# Patient Record
Sex: MALE | Race: WHITE
[De-identification: names, ages, dates, MRNs, and addresses within clinical notes are randomized per-mention and may not be internally consistent; named-entity substitution may affect disease eponyms.]

---

## 2018-03-01 ENCOUNTER — HOSPITAL ENCOUNTER (EMERGENCY)
Dept: HOSPITAL 36 - ER | Age: 48
Discharge: HOME | End: 2018-03-01
Payer: MEDICARE

## 2018-03-01 DIAGNOSIS — N34.2: Primary | ICD-10-CM

## 2018-03-01 PROCEDURE — Z7502: HCPCS
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
## 2018-03-06 ENCOUNTER — HOSPITAL ENCOUNTER (INPATIENT)
Dept: HOSPITAL 36 - ER | Age: 48
LOS: 6 days | Discharge: HOME | DRG: 871 | End: 2018-03-12
Attending: FAMILY MEDICINE | Admitting: FAMILY MEDICINE
Payer: MEDICARE

## 2018-03-06 DIAGNOSIS — J20.9: ICD-10-CM

## 2018-03-06 DIAGNOSIS — A41.9: Primary | ICD-10-CM

## 2018-03-06 DIAGNOSIS — E87.1: ICD-10-CM

## 2018-03-06 DIAGNOSIS — Z85.72: ICD-10-CM

## 2018-03-06 DIAGNOSIS — J96.20: ICD-10-CM

## 2018-03-06 DIAGNOSIS — Z89.612: ICD-10-CM

## 2018-03-06 LAB
ALBUMIN SERPL-MCNC: 4.2 GM/DL (ref 4.2–5.5)
ALBUMIN/GLOB SERPL: 1.3 {RATIO} (ref 1–1.8)
ALP SERPL-CCNC: 93 U/L (ref 34–104)
ALT SERPL-CCNC: 37 U/L (ref 7–52)
ANION GAP SERPL CALC-SCNC: 8.8 MMOL/L (ref 7–16)
AST SERPL-CCNC: 22 U/L (ref 13–39)
BASOPHILS # BLD AUTO: 0 TH/CUMM (ref 0–0.2)
BASOPHILS NFR BLD AUTO: 0.3 % (ref 0–2)
BILIRUB SERPL-MCNC: 0.6 MG/DL (ref 0.3–1)
BUN SERPL-MCNC: 11 MG/DL (ref 7–25)
CALCIUM SERPL-MCNC: 9.6 MG/DL (ref 8.6–10.3)
CHLORIDE SERPL-SCNC: 102 MEQ/L (ref 98–107)
CO2 SERPL-SCNC: 23 MEQ/L (ref 21–31)
CREAT SERPL-MCNC: 1 MG/DL (ref 0.7–1.3)
EOSINOPHIL # BLD AUTO: 0.3 TH/CMM (ref 0.1–0.4)
EOSINOPHIL NFR BLD AUTO: 2.4 % (ref 0–5)
ERYTHROCYTE [DISTWIDTH] IN BLOOD BY AUTOMATED COUNT: 15 % (ref 11.5–20)
GLOBULIN SER-MCNC: 3.3 GM/DL
GLUCOSE SERPL-MCNC: 125 MG/DL (ref 70–105)
HCT VFR BLD CALC: 43.7 % (ref 41–60)
HGB BLD-MCNC: 15.1 GM/DL (ref 12–16)
LYMPHOCYTE AB SER FC-ACNC: 2 TH/CMM (ref 1.5–3)
LYMPHOCYTES NFR BLD AUTO: 15.5 % (ref 20–50)
MCH RBC QN AUTO: 29.2 PG (ref 26–30)
MCHC RBC AUTO-ENTMCNC: 34.6 PG (ref 28–36)
MCV RBC AUTO: 84.4 FL (ref 80–99)
MONOCYTES # BLD AUTO: 0.6 TH/CMM (ref 0.3–1)
MONOCYTES NFR BLD AUTO: 4.6 % (ref 2–10)
NEUTROPHILS # BLD: 9.8 TH/CMM (ref 1.8–8)
NEUTROPHILS NFR BLD AUTO: 77.2 % (ref 40–80)
PCO2 BLDA: 38 MMHG (ref 35–45)
PLATELET # BLD: 260 TH/CMM (ref 150–400)
PMV BLD AUTO: 7.7 FL
PO2 BLDA: 52 MMHG (ref 80–100)
POTASSIUM SERPL-SCNC: 3.8 MEQ/L (ref 3.5–5.1)
RBC # BLD AUTO: 5.18 MIL/CMM (ref 4.3–5.7)
SAO2 % BLDA: 88 % (ref 92–100)
SODIUM SERPL-SCNC: 130 MEQ/L (ref 136–145)
WBC # BLD AUTO: 12.7 TH/CMM (ref 4.8–10.8)

## 2018-03-06 PROCEDURE — Z7610: HCPCS
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
## 2018-03-07 VITALS — DIASTOLIC BLOOD PRESSURE: 75 MMHG | SYSTOLIC BLOOD PRESSURE: 121 MMHG

## 2018-03-07 LAB
ALBUMIN SERPL-MCNC: 3.8 GM/DL (ref 4.2–5.5)
ALBUMIN/GLOB SERPL: 1.3 {RATIO} (ref 1–1.8)
ALP SERPL-CCNC: 84 U/L (ref 34–104)
ALT SERPL-CCNC: 30 U/L (ref 7–52)
ANION GAP SERPL CALC-SCNC: 9 MMOL/L (ref 7–16)
AST SERPL-CCNC: 17 U/L (ref 13–39)
BASOPHILS # BLD AUTO: 0 TH/CUMM (ref 0–0.2)
BASOPHILS NFR BLD AUTO: 0.2 % (ref 0–2)
BILIRUB SERPL-MCNC: 0.5 MG/DL (ref 0.3–1)
BUN SERPL-MCNC: 11 MG/DL (ref 7–25)
CALCIUM SERPL-MCNC: 9.2 MG/DL (ref 8.6–10.3)
CHLORIDE SERPL-SCNC: 107 MEQ/L (ref 98–107)
CO2 SERPL-SCNC: 24 MEQ/L (ref 21–31)
CREAT SERPL-MCNC: 0.9 MG/DL (ref 0.7–1.3)
EOSINOPHIL # BLD AUTO: 0.4 TH/CMM (ref 0.1–0.4)
EOSINOPHIL NFR BLD AUTO: 5.4 % (ref 0–5)
ERYTHROCYTE [DISTWIDTH] IN BLOOD BY AUTOMATED COUNT: 15.2 % (ref 11.5–20)
FLUAV AG NPH QL IA: (no result)
FLUBV AG NPH QL IA: (no result)
GLOBULIN SER-MCNC: 3 GM/DL
GLUCOSE SERPL-MCNC: 105 MG/DL (ref 70–105)
HCT VFR BLD CALC: 39.9 % (ref 41–60)
HGB BLD-MCNC: 13.7 GM/DL (ref 12–16)
LYMPHOCYTE AB SER FC-ACNC: 1.8 TH/CMM (ref 1.5–3)
LYMPHOCYTES NFR BLD AUTO: 22.5 % (ref 20–50)
MCH RBC QN AUTO: 29 PG (ref 26–30)
MCHC RBC AUTO-ENTMCNC: 34.3 PG (ref 28–36)
MCV RBC AUTO: 84.7 FL (ref 80–99)
MONOCYTES # BLD AUTO: 0.7 TH/CMM (ref 0.3–1)
MONOCYTES NFR BLD AUTO: 9 % (ref 2–10)
NEUTROPHILS # BLD: 5.1 TH/CMM (ref 1.8–8)
NEUTROPHILS NFR BLD AUTO: 62.9 % (ref 40–80)
PLATELET # BLD: 217 TH/CMM (ref 150–400)
PMV BLD AUTO: 7.9 FL
POTASSIUM SERPL-SCNC: 4 MEQ/L (ref 3.5–5.1)
RBC # BLD AUTO: 4.71 MIL/CMM (ref 4.3–5.7)
SODIUM SERPL-SCNC: 136 MEQ/L (ref 136–145)
WBC # BLD AUTO: 8 TH/CMM (ref 4.8–10.8)

## 2018-03-07 RX ADMIN — IPRATROPIUM BROMIDE SCH MG: 0.5 SOLUTION RESPIRATORY (INHALATION) at 07:01

## 2018-03-07 RX ADMIN — IPRATROPIUM BROMIDE SCH MG: 0.5 SOLUTION RESPIRATORY (INHALATION) at 02:47

## 2018-03-07 RX ADMIN — IPRATROPIUM BROMIDE SCH MG: 0.5 SOLUTION RESPIRATORY (INHALATION) at 20:27

## 2018-03-07 RX ADMIN — IPRATROPIUM BROMIDE SCH MG: 0.5 SOLUTION RESPIRATORY (INHALATION) at 12:22

## 2018-03-07 RX ADMIN — IPRATROPIUM BROMIDE SCH MG: 0.5 SOLUTION RESPIRATORY (INHALATION) at 23:14

## 2018-03-07 RX ADMIN — IPRATROPIUM BROMIDE SCH MG: 0.5 SOLUTION RESPIRATORY (INHALATION) at 15:28
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
## 2018-03-07 NOTE — DIAGNOSTIC IMAGING REPORT
CHEST X-RAY: AP view



INDICATION: Cough



COMPARISON: 2/5/2017



FINDINGS: Right-sided axillary clips are noted.  Mild increased

interstitial lung markings are noted.  There is no focal consolidation

or pleural effusions The heart is normal in size. The osseous structures

demonstrate no acute abnormalities.  Small bone island of the right

humeral head is noted



IMPRESSION: 



Mild increased interstitial lung markings, nonspecific.  No focal

airspace consolidation identified.



Postsurgical changes of the right axillary region.

## 2018-03-08 RX ADMIN — HYDROCODONE BITATRATE AND ACETAMINOPHEN PRN TAB: 10; 325 TABLET ORAL at 20:49

## 2018-03-08 RX ADMIN — IPRATROPIUM BROMIDE SCH MG: 0.5 SOLUTION RESPIRATORY (INHALATION) at 20:42

## 2018-03-08 RX ADMIN — IPRATROPIUM BROMIDE SCH MG: 0.5 SOLUTION RESPIRATORY (INHALATION) at 15:37

## 2018-03-08 RX ADMIN — IPRATROPIUM BROMIDE SCH MG: 0.5 SOLUTION RESPIRATORY (INHALATION) at 11:15

## 2018-03-08 RX ADMIN — IPRATROPIUM BROMIDE SCH MG: 0.5 SOLUTION RESPIRATORY (INHALATION) at 07:45

## 2018-03-08 RX ADMIN — IPRATROPIUM BROMIDE SCH MG: 0.5 SOLUTION RESPIRATORY (INHALATION) at 03:15

## 2018-03-08 NOTE — GENERAL PROGRESS NOTE
Subjective





- Review of Systems


Events since last encounter: 





c/o cough 


deneis sob ,cp 





Objective





- Results


Result Diagrams: 


 03/07/18 06:39





 03/07/18 06:39


Recent Labs: 


 Laboratory Last Values











WBC  8.0 Th/cmm (4.8-10.8)   03/07/18  06:39    


 


RBC  4.71 Mil/cmm (4.30-5.70)   03/07/18  06:39    


 


Hgb  13.7 gm/dL (12-16)   03/07/18  06:39    


 


Hct  39.9 % (41.0-60)  L  03/07/18  06:39    


 


MCV  84.7 fl (80-99)   03/07/18  06:39    


 


MCH  29.0 pg (26.0-30.0)   03/07/18  06:39    


 


MCHC Differential  34.3 pg (28.0-36.0)   03/07/18  06:39    


 


RDW  15.2 % (11.5-20.0)   03/07/18  06:39    


 


Plt Count  217 Th/cmm (150-400)   03/07/18  06:39    


 


MPV  7.9 fl  03/07/18  06:39    


 


Neutrophils %  62.9 % (40.0-80.0)   03/07/18  06:39    


 


Lymphocytes %  22.5 % (20.0-50.0)   03/07/18  06:39    


 


Monocytes %  9.0 % (2.0-10.0)   03/07/18  06:39    


 


Eosinophils %  5.4 % (0.0-5.0)  H  03/07/18  06:39    


 


Basophils %  0.2 % (0.0-2.0)   03/07/18  06:39    


 


Specimen Source  arterial   03/06/18  21:23    


 


Sample Site  RB   03/06/18  21:23    


 


pH  7.44  (7.35-7.45)   03/06/18  21:23    


 


pCO2  38.0 mmHg (35.0-45.0)   03/06/18  21:23    


 


pO2  52.0 mmHg (80.0-100.0)  L  03/06/18  21:23    


 


HCO3  26.0 mEq/L (20.0-26.0)   03/06/18  21:23    


 


Base Excess  1.7 mEq/L (-3.0-3.0)   03/06/18  21:23    


 


O2 Saturation  88.0 % (92.0-100.0)  L  03/06/18  21:23    


 


Phong Test  Positive   03/06/18  21:23    


 


Vent Rate  N/A   03/06/18  21:23    


 


Inspired O2  21   03/06/18  21:23    


 


Tidal Volume  N/A   03/06/18  21:23    


 


PEEP  N/A   03/06/18  21:23    


 


Pressure (ins/psv/peep)  N/A   03/06/18  21:23    


 


Critical Value  MM,RCP   03/06/18  21:23    


 


Sodium  136 mEq/L (136-145)   03/07/18  06:39    


 


Potassium  4.0 mEq/L (3.5-5.1)   03/07/18  06:39    


 


Chloride  107 mEq/L ()   03/07/18  06:39    


 


Carbon Dioxide  24.0 mEq/L (21.0-31.0)   03/07/18  06:39    


 


Anion Gap  9.0  (7.0-16.0)   03/07/18  06:39    


 


BUN  11 mg/dL (7-25)   03/07/18  06:39    


 


Creatinine  0.9 mg/dL (0.7-1.3)   03/07/18  06:39    


 


Est GFR ( Amer)  > 60.0 ml/min (>90)   03/07/18  06:39    


 


Est GFR (Non-Af Amer)  > 60.0 ml/min  03/07/18  06:39    


 


BUN/Creatinine Ratio  12.2   03/07/18  06:39    


 


Glucose  105 mg/dL ()   03/07/18  06:39    


 


Whole Bld Lactic Acid  1.57 mmol/L (0.60-1.99)   03/06/18  21:55    


 


Calcium  9.2 mg/dL (8.6-10.3)   03/07/18  06:39    


 


Total Bilirubin  0.5 mg/dL (0.3-1.0)   03/07/18  06:39    


 


AST  17 U/L (13-39)   03/07/18  06:39    


 


ALT  30 U/L (7-52)   03/07/18  06:39    


 


Alkaline Phosphatase  84 U/L ()   03/07/18  06:39    


 


Total Protein  6.8 gm/dL (6.0-8.3)   03/07/18  06:39    


 


Albumin  3.8 gm/dL (4.2-5.5)  L  03/07/18  06:39    


 


Globulin  3.0 gm/dL  03/07/18  06:39    


 


Albumin/Globulin Ratio  1.3  (1.0-1.8)   03/07/18  06:39    


 


Influenza A (Rapid)  NEG FOR INF A   03/07/18  13:45    


 


Influenza B (Rapid)  NEG FOR INF B   03/07/18  13:45    














- Physical Exam


Vitals and I&O: 


 Vital Signs











Temp  98.2 F   03/08/18 07:42


 


Pulse  104   03/08/18 08:12


 


Resp  18   03/08/18 08:12


 


BP  95/60   03/08/18 07:42


 


Pulse Ox  94   03/08/18 08:12








 Intake & Output











 03/07/18 03/08/18 03/08/18





 18:59 06:59 18:59


 


Intake Total 1300 250 


 


Output Total 800  


 


Balance 500 250 


 


Weight (lbs) 98.611 kg 97.976 kg 


 


Intake:   


 


  Intake, IV Amount 100 250 


 


    Azithromycin 500 mg In  250 





    Sodium Chloride 0.9% 250   





    ml @ 250 mls/hr IV Q24HR   





    [facility] Rx#:[id number]   


 


    cefTRIAXone 2 gm In 100  





    Sodium Chloride 0.9% 100   





    ml @ 100 mls/hr IV Q24H   





    [facility] Rx#:[id number]   


 


  Oral 1200  


 


Output:   


 


  Urine 800  


 


Other:   


 


  # Bowel Movements 0  











Active Medications: 


Current Medications





Azithromycin 500 mg/ Sodium (Chloride)  250 mls @ 250 mls/hr IV Q24HR [facility]


   Stop: 05/06/18 20:59


   Last Infusion: 03/07/18 21:20 Dose:  Infused


Ceftriaxone Sodium 2 gm/ (Sodium Chloride)  100 mls @ 100 mls/hr IV Q24H [facility]


   Stop: 05/06/18 14:59


   Last Infusion: 03/07/18 17:55 Dose:  Infused


Ipratropium Bromide (Atrovent Neb 0.5mg/2.5ml)  0.5 mg HHN Q4HRT [facility]


   Stop: 05/06/18 02:59


   Last Admin: 03/08/18 11:15 Dose:  0.5 mg


Loratadine (Claritin)  10 mg PO DAILY PRN


   PRN Reason: Allergy Symptoms


   Stop: 05/06/18 13:55


Methylprednisolone Sodium Succinate (Solu-Medrol)  60 mg IVP Q6HR [facility]


   Stop: 05/06/18 17:59


   Last Admin: 03/08/18 07:42 Dose:  60 mg


Oxymetazoline HCl (Afrin)  1 spr NS Q12HR PRN


   PRN Reason: Nasal Congestion


   Stop: 03/21/18 13:55

## 2018-03-08 NOTE — DIAGNOSTIC IMAGING REPORT
CT Chest without IV contrast



HISTORY: Pneumonia



COMPARISON: Chest x-ray performed on 3/6/2018.



Technique: Axial images were obtained from the base of the neck to the

upper abdomen without IV contrast.  Reconstructions were made.  Total

, CTD I 8.7



Findings:



Evaluation of the mediastinum is limited due to lack of IV contrast. 

Few borderline prominent mediastinal lymph nodes are noted the largest

to the left of the main pulmonary artery measuring 0.9 x 0.5 cm.  Mild

coronary artery atherosclerosis is noted.  Heart size is normal.  No

pericardial effusion.  Postsurgical changes of the right axillary region

are noted.



Evaluation of lung fields demonstrate no focal consolidation or

effusions.  Minimal hypoventilatory atelectatic changes are noted.  3-mm

nodular density is seen along the left major fissure (image 57, series

5).



The upper abdomen demonstrates fatty infiltration of the liver.  Minimal

nonspecific bilateral perinephric inflammatory changes are noted.  The

osseous structures demonstrate no acute abnormalities.  Mild scoliosis

is noted.



IMPRESSION:



Minimal hypoventilatory changes of the lungs.  No focal consolidation or

effusions.  



3 mm nodular density along the left major fissure probably due to old

infectious or old inflammatory process.  If indicated, follow up may be

obtained



Minimal coronary artery atherosclerotic vascular disease.



Postsurgical changes of right axillary region, please correlate

clinically.



A few borderline prominent mediastinal lymph nodes, nonspecific.



Hepatic steatosis.

## 2018-03-09 LAB
ALBUMIN SERPL-MCNC: 4.1 GM/DL (ref 4.2–5.5)
ALBUMIN/GLOB SERPL: 1.3 {RATIO} (ref 1–1.8)
ALP SERPL-CCNC: 87 U/L (ref 34–104)
ALT SERPL-CCNC: 27 U/L (ref 7–52)
ANION GAP SERPL CALC-SCNC: 11.9 MMOL/L (ref 7–16)
AST SERPL-CCNC: 11 U/L (ref 13–39)
BILIRUB SERPL-MCNC: 0.2 MG/DL (ref 0.3–1)
BUN SERPL-MCNC: 23 MG/DL (ref 7–25)
CALCIUM SERPL-MCNC: 9.4 MG/DL (ref 8.6–10.3)
CHLORIDE SERPL-SCNC: 106 MEQ/L (ref 98–107)
CO2 SERPL-SCNC: 21.1 MEQ/L (ref 21–31)
CREAT SERPL-MCNC: 1.1 MG/DL (ref 0.7–1.3)
ERYTHROCYTE [DISTWIDTH] IN BLOOD BY AUTOMATED COUNT: 15.3 % (ref 11.5–20)
GLOBULIN SER-MCNC: 3.2 GM/DL
GLUCOSE SERPL-MCNC: 254 MG/DL (ref 70–105)
HBA1C MFR BLD: 5.5 % (ref 4–6)
HCT VFR BLD CALC: 42.8 % (ref 41–60)
HGB BLD-MCNC: 11 G/DL (ref 4–35)
HGB BLD-MCNC: 14.4 GM/DL (ref 12–16)
LYMPHOCYTES # BLD MANUAL: 12 % (ref 20–50)
MANUAL DIFFERENTIAL PERFORMED BLD QL: YES
MCH RBC QN AUTO: 28.8 PG (ref 26–30)
MCHC RBC AUTO-ENTMCNC: 33.6 PG (ref 28–36)
MCV RBC AUTO: 85.8 FL (ref 80–99)
MONOCYTES # BLD MANUAL: 4 % (ref 2–10)
NEUTROPHILS NFR BLD AUTO: 78 % (ref 40–80)
NEUTS BAND NFR BLD: 6 % (ref 0–10)
PLATELET # BLD: 291 TH/CMM (ref 150–400)
PMV BLD AUTO: 8 FL
POTASSIUM SERPL-SCNC: 4 MEQ/L (ref 3.5–5.1)
RBC # BLD AUTO: 4.99 MIL/CMM (ref 4.3–5.7)
SODIUM SERPL-SCNC: 135 MEQ/L (ref 136–145)
TOTAL CELLS COUNTED BLD: 100
WBC # BLD AUTO: 19 TH/CMM (ref 4.8–10.8)

## 2018-03-09 RX ADMIN — IPRATROPIUM BROMIDE SCH MG: 0.5 SOLUTION RESPIRATORY (INHALATION) at 00:10

## 2018-03-09 RX ADMIN — IPRATROPIUM BROMIDE SCH MG: 0.5 SOLUTION RESPIRATORY (INHALATION) at 22:57

## 2018-03-09 RX ADMIN — IPRATROPIUM BROMIDE SCH MG: 0.5 SOLUTION RESPIRATORY (INHALATION) at 03:07

## 2018-03-09 RX ADMIN — IPRATROPIUM BROMIDE SCH MG: 0.5 SOLUTION RESPIRATORY (INHALATION) at 07:18

## 2018-03-09 RX ADMIN — HYDROCODONE BITATRATE AND ACETAMINOPHEN PRN TAB: 10; 325 TABLET ORAL at 22:15

## 2018-03-09 RX ADMIN — IPRATROPIUM BROMIDE SCH MG: 0.5 SOLUTION RESPIRATORY (INHALATION) at 10:55

## 2018-03-09 RX ADMIN — METHYLPREDNISOLONE SODIUM SUCCINATE SCH MG: 40 INJECTION, POWDER, FOR SOLUTION INTRAMUSCULAR; INTRAVENOUS at 22:30

## 2018-03-09 RX ADMIN — HYDROCODONE BITATRATE AND ACETAMINOPHEN PRN TAB: 10; 325 TABLET ORAL at 09:50

## 2018-03-09 RX ADMIN — IPRATROPIUM BROMIDE SCH MG: 0.5 SOLUTION RESPIRATORY (INHALATION) at 18:53

## 2018-03-09 NOTE — INTERNAL MEDICINE PROG NOTE
Internal Medicine Subjective





- Subjective


Service Date: 03/09/18


Patient seen and examined:: with staff


Patient is:: awake, verbal


Patient Complaints of:: cough


Per staff patient has:: no adverse event, tolerating meds





Internal Medicine Objective





- Results


Result Diagrams: 


 03/09/18 05:21





 03/09/18 05:21


Recent Labs: 


 Laboratory Last Values











WBC  19.0 Th/cmm (4.8-10.8)  H  03/09/18  05:21    


 


RBC  4.99 Mil/cmm (4.30-5.70)   03/09/18  05:21    


 


Hgb  14.4 gm/dL (12-16)   03/09/18  05:21    


 


Hct  42.8 % (41.0-60)   03/09/18  05:21    


 


MCV  85.8 fl (80-99)   03/09/18  05:21    


 


MCH  28.8 pg (26.0-30.0)   03/09/18  05:21    


 


MCHC Differential  33.6 pg (28.0-36.0)   03/09/18  05:21    


 


RDW  15.3 % (11.5-20.0)   03/09/18  05:21    


 


Plt Count  291 Th/cmm (150-400)   03/09/18  05:21    


 


MPV  8.0 fl  03/09/18  05:21    


 


Neutrophils %  62.9 % (40.0-80.0)   03/07/18  06:39    


 


Band Neutrophils %  6 % (0-10)   03/09/18  05:21    


 


Lymphocytes %  22.5 % (20.0-50.0)   03/07/18  06:39    


 


Monocytes %  9.0 % (2.0-10.0)   03/07/18  06:39    


 


Eosinophils %  5.4 % (0.0-5.0)  H  03/07/18  06:39    


 


Basophils %  0.2 % (0.0-2.0)   03/07/18  06:39    


 


Neutrophils (Manual)  78 % (40-80)   03/09/18  05:21    


 


Lymphocytes  12 % (20-50)  L  03/09/18  05:21    


 


Monocytes  4 % (2-10)   03/09/18  05:21    


 


Specimen Source  arterial   03/06/18  21:23    


 


Sample Site  RB   03/06/18  21:23    


 


pH  7.44  (7.35-7.45)   03/06/18  21:23    


 


pCO2  38.0 mmHg (35.0-45.0)   03/06/18  21:23    


 


pO2  52.0 mmHg (80.0-100.0)  L  03/06/18  21:23    


 


HCO3  26.0 mEq/L (20.0-26.0)   03/06/18  21:23    


 


Base Excess  1.7 mEq/L (-3.0-3.0)   03/06/18  21:23    


 


O2 Saturation  88.0 % (92.0-100.0)  L  03/06/18  21:23    


 


Phong Test  Positive   03/06/18  21:23    


 


Vent Rate  N/A   03/06/18  21:23    


 


Inspired O2  21   03/06/18  21:23    


 


Tidal Volume  N/A   03/06/18  21:23    


 


PEEP  N/A   03/06/18  21:23    


 


Pressure (ins/psv/peep)  N/A   03/06/18  21:23    


 


Critical Value  MM,RCP   03/06/18  21:23    


 


Sodium  135 mEq/L (136-145)  L  03/09/18  05:21    


 


Potassium  4.0 mEq/L (3.5-5.1)   03/09/18  05:21    


 


Chloride  106 mEq/L ()   03/09/18  05:21    


 


Carbon Dioxide  21.1 mEq/L (21.0-31.0)   03/09/18  05:21    


 


Anion Gap  11.9  (7.0-16.0)   03/09/18  05:21    


 


BUN  23 mg/dL (7-25)   03/09/18  05:21    


 


Creatinine  1.1 mg/dL (0.7-1.3)   03/09/18  05:21    


 


Est GFR ( Amer)  > 60.0 ml/min (>90)   03/09/18  05:21    


 


Est GFR (Non-Af Amer)  > 60.0 ml/min  03/09/18  05:21    


 


BUN/Creatinine Ratio  20.9   03/09/18  05:21    


 


Glucose  254 mg/dL ()  H  03/09/18  05:21    


 


Whole Bld Lactic Acid  1.57 mmol/L (0.60-1.99)   03/06/18  21:55    


 


Calcium  9.4 mg/dL (8.6-10.3)   03/09/18  05:21    


 


Total Bilirubin  0.2 mg/dL (0.3-1.0)  L  03/09/18  05:21    


 


AST  11 U/L (13-39)  L  03/09/18  05:21    


 


ALT  27 U/L (7-52)   03/09/18  05:21    


 


Alkaline Phosphatase  87 U/L ()   03/09/18  05:21    


 


Total Protein  7.3 gm/dL (6.0-8.3)   03/09/18  05:21    


 


Albumin  4.1 gm/dL (4.2-5.5)  L  03/09/18  05:21    


 


Globulin  3.2 gm/dL  03/09/18  05:21    


 


Albumin/Globulin Ratio  1.3  (1.0-1.8)   03/09/18  05:21    


 


Influenza A (Rapid)  NEG FOR INF A   03/07/18  13:45    


 


Influenza B (Rapid)  NEG FOR INF B   03/07/18  13:45    


 


Ur L.pneumophila Ag  Negative  (Negative)   03/07/18  21:10    














- Physical Exam


Vitals and I&O: 


 Vital Signs











Temp  97.8 F   03/09/18 16:04


 


Pulse  110   03/09/18 16:04


 


Resp  18   03/09/18 16:04


 


BP  110/72   03/09/18 16:04


 


Pulse Ox  99   03/09/18 16:04








 Intake & Output











 03/08/18 03/09/18 03/09/18





 18:59 06:59 18:59


 


Intake Total 1300  100


 


Balance 1300  100


 


Weight (lbs) 216 lb  


 


Intake:   


 


  Intake, IV Amount 100  100


 


    cefTRIAXone 2 gm In 100  100





    Sodium Chloride 0.9% 100   





    ml @ 100 mls/hr IV Q24H   





    [facility] Rx#:[id number]   


 


  Oral 1200  











Active Medications: 


Current Medications





Acetaminophen/Hydrocodone Bitart (Norco 10 Mg/325 Mg)  1 tab PO Q4H PRN


   PRN Reason: Pain (Moderate)


   Stop: 05/07/18 19:04


   Last Admin: 03/09/18 09:50 Dose:  1 tab


Azithromycin 500 mg/ Sodium (Chloride)  250 mls @ 250 mls/hr IV Q24HR [facility]


   Stop: 05/06/18 20:59


   Last Admin: 03/08/18 20:49 Dose:  250 mls/hr


Ceftriaxone Sodium 2 gm/ (Sodium Chloride)  100 mls @ 100 mls/hr IV Q24H [facility]


   Stop: 05/06/18 14:59


   Last Infusion: 03/09/18 15:29 Dose:  Infused


Ipratropium Bromide (Atrovent Neb 0.5mg/2.5ml)  0.5 mg HHN Q4HRT [facility]


   Stop: 05/06/18 02:59


   Last Admin: 03/09/18 10:55 Dose:  0.5 mg


Loratadine (Claritin)  10 mg PO DAILY PRN


   PRN Reason: Allergy Symptoms


   Stop: 05/06/18 13:55


Methylprednisolone Sodium Succinate (Solu-Medrol)  40 mg IVP Q12HR [facility]


   Stop: 05/08/18 20:59


Oxymetazoline HCl (Afrin)  1 spr NS Q12HR PRN


   PRN Reason: Nasal Congestion


   Stop: 03/21/18 13:55








General: alert


HEENT: NC/AT, PERRLA


Neck: Supple


Lungs: CTAB


Cardiovascular: RRR, Normal S1, Normal S2, without murmur


Abdomen: soft, non-tender, non-distended


Extremities: clear


Neurological: alert





Internal Medicine Assmt/Plan





- Assessment


Assessment: 





bronchitis


leukocytosis


hyponatremia


possible pna


left aka





- Plan


Plan: 





continue ivabx


supplemental oxygen and inhalation treatments


continue current orders

## 2018-03-10 RX ADMIN — IPRATROPIUM BROMIDE SCH MG: 0.5 SOLUTION RESPIRATORY (INHALATION) at 11:28

## 2018-03-10 RX ADMIN — HYDROCODONE BITATRATE AND ACETAMINOPHEN PRN TAB: 10; 325 TABLET ORAL at 21:31

## 2018-03-10 RX ADMIN — IPRATROPIUM BROMIDE SCH: 0.5 SOLUTION RESPIRATORY (INHALATION) at 19:42

## 2018-03-10 RX ADMIN — HYDROCODONE BITATRATE AND ACETAMINOPHEN PRN TAB: 10; 325 TABLET ORAL at 14:18

## 2018-03-10 RX ADMIN — METHYLPREDNISOLONE SODIUM SUCCINATE SCH MG: 40 INJECTION, POWDER, FOR SOLUTION INTRAMUSCULAR; INTRAVENOUS at 21:30

## 2018-03-10 RX ADMIN — IPRATROPIUM BROMIDE SCH MG: 0.5 SOLUTION RESPIRATORY (INHALATION) at 02:23

## 2018-03-10 RX ADMIN — IPRATROPIUM BROMIDE SCH: 0.5 SOLUTION RESPIRATORY (INHALATION) at 23:06

## 2018-03-10 RX ADMIN — METHYLPREDNISOLONE SODIUM SUCCINATE SCH MG: 40 INJECTION, POWDER, FOR SOLUTION INTRAMUSCULAR; INTRAVENOUS at 09:11

## 2018-03-10 RX ADMIN — HYDROCODONE BITATRATE AND ACETAMINOPHEN PRN TAB: 10; 325 TABLET ORAL at 02:44

## 2018-03-10 RX ADMIN — IPRATROPIUM BROMIDE SCH MG: 0.5 SOLUTION RESPIRATORY (INHALATION) at 07:44

## 2018-03-10 RX ADMIN — IPRATROPIUM BROMIDE SCH MG: 0.5 SOLUTION RESPIRATORY (INHALATION) at 14:56

## 2018-03-10 RX ADMIN — HYDROCODONE BITATRATE AND ACETAMINOPHEN PRN TAB: 10; 325 TABLET ORAL at 09:12

## 2018-03-10 NOTE — INFECTIOUS DISEASE PROG NOTE
Infectious Disease Subjective





- Review of Systems


Service Date: 03/10/18


Subjective: 





There is no new change, there is no fever.








Infectious Disease Objective





- Results


Result Diagrams: 


 03/09/18 05:21





 03/09/18 05:21


Recent Labs: 


 Laboratory Last Values











WBC  19.0 Th/cmm (4.8-10.8)  H  03/09/18  05:21    


 


RBC  4.99 Mil/cmm (4.30-5.70)   03/09/18  05:21    


 


Hgb  14.4 gm/dL (12-16)   03/09/18  05:21    


 


Hct  42.8 % (41.0-60)   03/09/18  05:21    


 


MCV  85.8 fl (80-99)   03/09/18  05:21    


 


MCH  28.8 pg (26.0-30.0)   03/09/18  05:21    


 


MCHC Differential  33.6 pg (28.0-36.0)   03/09/18  05:21    


 


RDW  15.3 % (11.5-20.0)   03/09/18  05:21    


 


Plt Count  291 Th/cmm (150-400)   03/09/18  05:21    


 


MPV  8.0 fl  03/09/18  05:21    


 


Neutrophils %  62.9 % (40.0-80.0)   03/07/18  06:39    


 


Band Neutrophils %  6 % (0-10)   03/09/18  05:21    


 


Lymphocytes %  22.5 % (20.0-50.0)   03/07/18  06:39    


 


Monocytes %  9.0 % (2.0-10.0)   03/07/18  06:39    


 


Eosinophils %  5.4 % (0.0-5.0)  H  03/07/18  06:39    


 


Basophils %  0.2 % (0.0-2.0)   03/07/18  06:39    


 


Neutrophils (Manual)  78 % (40-80)   03/09/18  05:21    


 


Lymphocytes  12 % (20-50)  L  03/09/18  05:21    


 


Monocytes  4 % (2-10)   03/09/18  05:21    


 


Specimen Source  arterial   03/06/18  21:23    


 


Sample Site  RB   03/06/18  21:23    


 


pH  7.44  (7.35-7.45)   03/06/18  21:23    


 


pCO2  38.0 mmHg (35.0-45.0)   03/06/18  21:23    


 


pO2  52.0 mmHg (80.0-100.0)  L  03/06/18  21:23    


 


HCO3  26.0 mEq/L (20.0-26.0)   03/06/18  21:23    


 


Base Excess  1.7 mEq/L (-3.0-3.0)   03/06/18  21:23    


 


O2 Saturation  88.0 % (92.0-100.0)  L  03/06/18  21:23    


 


Phong Test  Positive   03/06/18  21:23    


 


Vent Rate  N/A   03/06/18  21:23    


 


Inspired O2  21   03/06/18  21:23    


 


Tidal Volume  N/A   03/06/18  21:23    


 


PEEP  N/A   03/06/18  21:23    


 


Pressure (ins/psv/peep)  N/A   03/06/18  21:23    


 


Critical Value  MM,RCP   03/06/18  21:23    


 


Sodium  135 mEq/L (136-145)  L  03/09/18  05:21    


 


Potassium  4.0 mEq/L (3.5-5.1)   03/09/18  05:21    


 


Chloride  106 mEq/L ()   03/09/18  05:21    


 


Carbon Dioxide  21.1 mEq/L (21.0-31.0)   03/09/18  05:21    


 


Anion Gap  11.9  (7.0-16.0)   03/09/18  05:21    


 


BUN  23 mg/dL (7-25)   03/09/18  05:21    


 


Creatinine  1.1 mg/dL (0.7-1.3)   03/09/18  05:21    


 


Est GFR ( Amer)  > 60.0 ml/min (>90)   03/09/18  05:21    


 


Est GFR (Non-Af Amer)  > 60.0 ml/min  03/09/18  05:21    


 


BUN/Creatinine Ratio  20.9   03/09/18  05:21    


 


Glucose  254 mg/dL ()  H  03/09/18  05:21    


 


Hemoglobin A1c %  5.5 % (4.0-6.0)   03/09/18  06:10    


 


Whole Bld Lactic Acid  1.57 mmol/L (0.60-1.99)   03/06/18  21:55    


 


Calcium  9.4 mg/dL (8.6-10.3)   03/09/18  05:21    


 


Total Bilirubin  0.2 mg/dL (0.3-1.0)  L  03/09/18  05:21    


 


AST  11 U/L (13-39)  L  03/09/18  05:21    


 


ALT  27 U/L (7-52)   03/09/18  05:21    


 


Alkaline Phosphatase  87 U/L ()   03/09/18  05:21    


 


Total Protein  7.3 gm/dL (6.0-8.3)   03/09/18  05:21    


 


Albumin  4.1 gm/dL (4.2-5.5)  L  03/09/18  05:21    


 


Globulin  3.2 gm/dL  03/09/18  05:21    


 


Albumin/Globulin Ratio  1.3  (1.0-1.8)   03/09/18  05:21    


 


Influenza A (Rapid)  NEG FOR INF A   03/07/18  13:45    


 


Influenza B (Rapid)  NEG FOR INF B   03/07/18  13:45    


 


Ur L.pneumophila Ag  Negative  (Negative)   03/07/18  21:10    














- Physical Exam


Vitals and I&O: 


 Vital Signs











Temp  98.4 F   03/10/18 08:00


 


Pulse  100   03/10/18 11:30


 


Resp  20   03/10/18 11:30


 


BP  132/86   03/10/18 08:00


 


Pulse Ox  92   03/10/18 11:30








 Intake & Output











 03/09/18 03/10/18 03/10/18





 18:59 06:59 18:59


 


Intake Total 100 1000 


 


Balance 100 1000 


 


Weight (lbs)  97.976 kg 


 


Intake:   


 


  Intake, IV Amount 100  


 


    cefTRIAXone 2 gm In 100  





    Sodium Chloride 0.9% 100   





    ml @ 100 mls/hr IV Q24H   





    [facility] Rx#:[id number]   


 


  Oral  1000 











Active Medications: 


Current Medications





Acetaminophen/Hydrocodone Bitart (Norco 10 Mg/325 Mg)  1 tab PO Q4H PRN


   PRN Reason: Pain (Moderate)


   Stop: 05/07/18 19:04


   Last Admin: 03/10/18 09:12 Dose:  1 tab


Azithromycin 500 mg/ Sodium (Chloride)  250 mls @ 250 mls/hr IV Q24HR [facility]


   Stop: 05/06/18 20:59


   Last Admin: 03/09/18 22:00 Dose:  250 mls/hr


Ceftriaxone Sodium 2 gm/ (Sodium Chloride)  100 mls @ 100 mls/hr IV Q24H [facility]


   Stop: 05/06/18 14:59


   Last Infusion: 03/09/18 15:29 Dose:  Infused


Ipratropium Bromide (Atrovent Neb 0.5mg/2.5ml)  0.5 mg HHN Q4HRT [facility]


   Stop: 05/06/18 02:59


   Last Admin: 03/10/18 11:28 Dose:  0.5 mg


Loratadine (Claritin)  10 mg PO DAILY PRN


   PRN Reason: Allergy Symptoms


   Stop: 05/06/18 13:55


Methylprednisolone Sodium Succinate (Solu-Medrol)  40 mg IVP Q12HR [facility]


   Stop: 05/08/18 20:59


   Last Admin: 03/10/18 09:11 Dose:  40 mg


Oxymetazoline HCl (Afrin)  1 spr NS Q12HR PRN


   PRN Reason: Nasal Congestion


   Stop: 03/21/18 13:55








General: no acute distress, well developed, well nourished


HEENT: atraumatic, normocephalic, PERRLA


Neck: supple, no thyromegaly, no lymphadenopathy


Cardiovascular: S1S2, regular


Lungs: clear to auscultation bilaterally, clear to percussion


Abdomen: soft, no tender, no distended


Extremities: no cyanosis, no clubbing, no edema


Neurological: awake, alert, oriented, CN 2-12 intact


Skin: intact





Infectious Disease Assmt/Plan





- Assessment


Assessment: 





1. Cough and shortness breath improving, likely he has bronchitis. CT scan of 

the chest revealed no pneumonia or infiltrate.


2. h/o HSCT.


3. h/o Lymphoma.


4. Leukocytosis. Likely 2/2 steroids.





- Plan


Plan: 





continue levaquin, will dc ceftriaxone. 


If leukocytosis comed to normal tomorrow, may dc antibiotics.

## 2018-03-11 LAB
ALBUMIN SERPL-MCNC: 3.8 GM/DL (ref 4.2–5.5)
ALBUMIN/GLOB SERPL: 1.4 {RATIO} (ref 1–1.8)
ALP SERPL-CCNC: 73 U/L (ref 34–104)
ALT SERPL-CCNC: 20 U/L (ref 7–52)
ANION GAP SERPL CALC-SCNC: 8 MMOL/L (ref 7–16)
AST SERPL-CCNC: 12 U/L (ref 13–39)
BASOPHILS # BLD AUTO: 0.1 TH/CUMM (ref 0–0.2)
BASOPHILS NFR BLD AUTO: 0.7 % (ref 0–2)
BILIRUB SERPL-MCNC: 0.3 MG/DL (ref 0.3–1)
BUN SERPL-MCNC: 21 MG/DL (ref 7–25)
CALCIUM SERPL-MCNC: 8.9 MG/DL (ref 8.6–10.3)
CHLORIDE SERPL-SCNC: 106 MEQ/L (ref 98–107)
CO2 SERPL-SCNC: 22.3 MEQ/L (ref 21–31)
CREAT SERPL-MCNC: 0.8 MG/DL (ref 0.7–1.3)
EOSINOPHIL # BLD AUTO: 0 TH/CMM (ref 0.1–0.4)
EOSINOPHIL NFR BLD AUTO: 0.1 % (ref 0–5)
ERYTHROCYTE [DISTWIDTH] IN BLOOD BY AUTOMATED COUNT: 15 % (ref 11.5–20)
GLOBULIN SER-MCNC: 2.7 GM/DL
GLUCOSE SERPL-MCNC: 202 MG/DL (ref 70–105)
HCT VFR BLD CALC: 42.9 % (ref 41–60)
HGB BLD-MCNC: 14.4 GM/DL (ref 12–16)
LYMPHOCYTE AB SER FC-ACNC: 2.5 TH/CMM (ref 1.5–3)
LYMPHOCYTES NFR BLD AUTO: 13.7 % (ref 20–50)
MCH RBC QN AUTO: 28.7 PG (ref 26–30)
MCHC RBC AUTO-ENTMCNC: 33.6 PG (ref 28–36)
MCV RBC AUTO: 85.6 FL (ref 80–99)
MONOCYTES # BLD AUTO: 1.5 TH/CMM (ref 0.3–1)
MONOCYTES NFR BLD AUTO: 8.3 % (ref 2–10)
NEUTROPHILS # BLD: 13.8 TH/CMM (ref 1.8–8)
NEUTROPHILS NFR BLD AUTO: 77.2 % (ref 40–80)
PLATELET # BLD: 252 TH/CMM (ref 150–400)
PMV BLD AUTO: 7.9 FL
POTASSIUM SERPL-SCNC: 4.3 MEQ/L (ref 3.5–5.1)
RBC # BLD AUTO: 5.02 MIL/CMM (ref 4.3–5.7)
SODIUM SERPL-SCNC: 132 MEQ/L (ref 136–145)
WBC # BLD AUTO: 17.9 TH/CMM (ref 4.8–10.8)

## 2018-03-11 RX ADMIN — IPRATROPIUM BROMIDE SCH: 0.5 SOLUTION RESPIRATORY (INHALATION) at 20:00

## 2018-03-11 RX ADMIN — METHYLPREDNISOLONE SODIUM SUCCINATE SCH MG: 40 INJECTION, POWDER, FOR SOLUTION INTRAMUSCULAR; INTRAVENOUS at 09:44

## 2018-03-11 RX ADMIN — HYDROCODONE BITATRATE AND ACETAMINOPHEN PRN TAB: 10; 325 TABLET ORAL at 20:32

## 2018-03-11 RX ADMIN — IPRATROPIUM BROMIDE SCH: 0.5 SOLUTION RESPIRATORY (INHALATION) at 15:01

## 2018-03-11 RX ADMIN — IPRATROPIUM BROMIDE SCH MG: 0.5 SOLUTION RESPIRATORY (INHALATION) at 23:10

## 2018-03-11 RX ADMIN — IPRATROPIUM BROMIDE SCH MG: 0.5 SOLUTION RESPIRATORY (INHALATION) at 11:32

## 2018-03-11 RX ADMIN — HYDROCODONE BITATRATE AND ACETAMINOPHEN PRN TAB: 10; 325 TABLET ORAL at 11:56

## 2018-03-11 RX ADMIN — IPRATROPIUM BROMIDE SCH: 0.5 SOLUTION RESPIRATORY (INHALATION) at 07:30

## 2018-03-11 RX ADMIN — HYDROCODONE BITATRATE AND ACETAMINOPHEN PRN TAB: 10; 325 TABLET ORAL at 03:21

## 2018-03-11 RX ADMIN — IPRATROPIUM BROMIDE SCH MG: 0.5 SOLUTION RESPIRATORY (INHALATION) at 03:30

## 2018-03-11 NOTE — PROGRESS NOTES
DATE:  03/10/2018



SUBJECTIVE:  The patient was seen in his room.  The patient is asleep, but

easily arousable, otherwise the patient appears to be in no acute distress. 

Denies any pain or discomfort.



OBJECTIVE:

HEENT:  Head is atraumatic and normocephalic.  Eyes:  Bilateral conjunctivae are

clear.  Bilateral pupils are equally round and reactive.

NECK:  Supple.  No JVD.

CARDIOVASCULAR:  S1 and S2, without murmur.

PULMONARY:  Clear to auscultation.

GASTROINTESTINAL:  Soft and nontender without guarding.  Positive bowel sounds.

MUSCULOSKELETAL:  No clubbing, no cyanosis.  The patient has a left above knee

amputation.



ASSESSMENT:

1.  Bronchitis.

2.  Possible pneumonia.

3.  Left above knee amputation.

4.  Hyponatremia.



PLAN:  We will keep the patient inpatient med-surg unit.  Continue antibiotics. 

Treatment plans were discussed with the patient's nurse.  Treatment plans were

discussed with Dr. Diego.





DD: 03/10/2018 11:29

DT: 03/11/2018 00:05

JOB# [id number]  [id number]

## 2018-03-11 NOTE — INFECTIOUS DISEASE PROG NOTE
Infectious Disease Subjective





- Review of Systems


Service Date: 03/11/18


Subjective: 





There is no new change, there is no fever.








Infectious Disease Objective





- Results


Result Diagrams: 


 03/12/18 05:50





 03/11/18 06:28


Recent Labs: 


 Laboratory Last Values











WBC  17.9 Th/cmm (4.8-10.8)  H  03/11/18  06:28    


 


RBC  5.02 Mil/cmm (4.30-5.70)   03/11/18  06:28    


 


Hgb  14.4 gm/dL (12-16)   03/11/18  06:28    


 


Hct  42.9 % (41.0-60)   03/11/18  06:28    


 


MCV  85.6 fl (80-99)   03/11/18  06:28    


 


MCH  28.7 pg (26.0-30.0)   03/11/18  06:28    


 


MCHC Differential  33.6 pg (28.0-36.0)   03/11/18  06:28    


 


RDW  15.0 % (11.5-20.0)   03/11/18  06:28    


 


Plt Count  252 Th/cmm (150-400)   03/11/18  06:28    


 


MPV  7.9 fl  03/11/18  06:28    


 


Neutrophils %  77.2 % (40.0-80.0)   03/11/18  06:28    


 


Band Neutrophils %  6 % (0-10)   03/09/18  05:21    


 


Lymphocytes %  13.7 % (20.0-50.0)  L  03/11/18  06:28    


 


Monocytes %  8.3 % (2.0-10.0)   03/11/18  06:28    


 


Eosinophils %  0.1 % (0.0-5.0)   03/11/18  06:28    


 


Basophils %  0.7 % (0.0-2.0)   03/11/18  06:28    


 


Neutrophils (Manual)  78 % (40-80)   03/09/18  05:21    


 


Lymphocytes  12 % (20-50)  L  03/09/18  05:21    


 


Monocytes  4 % (2-10)   03/09/18  05:21    


 


Specimen Source  arterial   03/06/18  21:23    


 


Sample Site  RB   03/06/18  21:23    


 


pH  7.44  (7.35-7.45)   03/06/18  21:23    


 


pCO2  38.0 mmHg (35.0-45.0)   03/06/18 21:23    


 


pO2  52.0 mmHg (80.0-100.0)  L  03/06/18 21:23    


 


HCO3  26.0 mEq/L (20.0-26.0)   03/06/18 21:23    


 


Base Excess  1.7 mEq/L (-3.0-3.0)   03/06/18 21:23    


 


O2 Saturation  88.0 % (92.0-100.0)  L  03/06/18 21:23    


 


Phong Test  Positive   03/06/18 21:23    


 


Vent Rate  N/A   03/06/18 21:23    


 


Inspired O2  21   03/06/18 21:23    


 


Tidal Volume  N/A   03/06/18 21:23    


 


PEEP  N/A   03/06/18 21:23    


 


Pressure (ins/psv/peep)  N/A   03/06/18 21:23    


 


Critical Value  MM,RCP   03/06/18 21:23    


 


Sodium  132 mEq/L (136-145)  L  03/11/18 06:28    


 


Potassium  4.3 mEq/L (3.5-5.1)   03/11/18 06:28    


 


Chloride  106 mEq/L ()   03/11/18 06:28    


 


Carbon Dioxide  22.3 mEq/L (21.0-31.0)   03/11/18 06:28    


 


Anion Gap  8.0  (7.0-16.0)   03/11/18 06:28    


 


BUN  21 mg/dL (7-25)   03/11/18 06:28    


 


Creatinine  0.8 mg/dL (0.7-1.3)   03/11/18 06:28    


 


Est GFR ( Amer)  > 60.0 ml/min (>90)   03/11/18 06:28    


 


Est GFR (Non-Af Amer)  > 60.0 ml/min  03/11/18 06:28    


 


BUN/Creatinine Ratio  26.3   03/11/18 06:28    


 


Glucose  202 mg/dL ()  H  03/11/18 06:28    


 


Hemoglobin A1c %  5.5 % (4.0-6.0)   03/09/18  06:10    


 


Whole Bld Lactic Acid  1.57 mmol/L (0.60-1.99)   03/06/18  21:55    


 


Calcium  8.9 mg/dL (8.6-10.3)   03/11/18  06:28    


 


Total Bilirubin  0.3 mg/dL (0.3-1.0)   03/11/18  06:28    


 


AST  12 U/L (13-39)  L  03/11/18  06:28    


 


ALT  20 U/L (7-52)   03/11/18  06:28    


 


Alkaline Phosphatase  73 U/L ()   03/11/18  06:28    


 


Total Protein  6.5 gm/dL (6.0-8.3)   03/11/18  06:28    


 


Albumin  3.8 gm/dL (4.2-5.5)  L  03/11/18  06:28    


 


Globulin  2.7 gm/dL  03/11/18  06:28    


 


Albumin/Globulin Ratio  1.4  (1.0-1.8)   03/11/18  06:28    


 


Influenza A (Rapid)  NEG FOR INF A   03/07/18  13:45    


 


Influenza B (Rapid)  NEG FOR INF B   03/07/18  13:45    


 


Ur L.pneumophila Ag  Negative  (Negative)   03/07/18  21:10    














- Physical Exam


Vitals and I&O: 


 Vital Signs











Temp  97.6 F   03/11/18 20:00


 


Pulse  94   03/11/18 23:14


 


Resp  20   03/11/18 23:14


 


BP  124/85   03/11/18 20:00


 


Pulse Ox  97   03/11/18 23:14








 Intake & Output











 03/11/18 03/11/18 03/12/18





 06:59 18:59 06:59


 


Intake Total  1000 


 


Balance  1000 


 


Weight (lbs)  98.203 kg 


 


Intake:   


 


  Intake, IV Amount   


 


    Azithromycin 500 mg In   





    Sodium Chloride 0.9% 250   





    ml @ 250 mls/hr IV Q24HR   





    [facility] Rx#:[id number]   


 


  Oral  1000 


 


Other:   


 


  # Voids  3 


 


  # Bowel Movements  1 


 


  Stool Characteristics   Soft





   Formed











Active Medications: 


Current Medications





Acetaminophen/Hydrocodone Bitart (Norco 10 Mg/325 Mg)  1 tab PO Q4H PRN


   PRN Reason: Pain (Moderate)


   Stop: 05/07/18 19:04


   Last Admin: 03/11/18 20:32 Dose:  1 tab


Azithromycin 500 mg/ Sodium (Chloride)  250 mls @ 250 mls/hr IV Q24HR [facility]


   Stop: 05/06/18 20:59


   Last Admin: 03/11/18 20:31 Dose:  250 mls/hr


Ceftriaxone Sodium 2 gm/ (Sodium Chloride)  100 mls @ 100 mls/hr IV Q24H [facility]


   Stop: 05/06/18 14:59


   Last Admin: 03/11/18 14:56 Dose:  100 mls/hr


Ipratropium Bromide (Atrovent Neb 0.5mg/2.5ml)  0.5 mg HHN Q4HRT [facility]


   Stop: 05/06/18 02:59


   Last Admin: 03/11/18 23:10 Dose:  0.5 mg


Loratadine (Claritin)  10 mg PO DAILY PRN


   PRN Reason: Allergy Symptoms


   Stop: 05/06/18 13:55


Oxymetazoline HCl (Afrin)  1 spr NS Q12HR PRN


   PRN Reason: Nasal Congestion


   Stop: 03/21/18 13:55


Prednisone (Deltasone)  10 mg PO DAILY [facility]


   Stop: 05/11/18 08:59








General: no acute distress, well developed, well nourished


HEENT: atraumatic, normocephalic, PERRLA


Neck: supple, no thyromegaly


Cardiovascular: S1S2, regular


Lungs: clear to auscultation bilaterally, clear to percussion


Abdomen: soft, no tender, no distended


Extremities: no cyanosis, no clubbing, no edema


Neurological: awake, alert, oriented


Skin: intact





Infectious Disease Assmt/Plan





- Assessment


Assessment: 





1. Cough and shortness breath improved, likely he has bronchitis. CT scan of 

the chest revealed no pneumonia or infiltrate.


2. h/o HSCT.


3. h/o Lymphoma.


4. Leukocytosis. Likely 2/2 steroids.





- Plan


Plan: 





change antibiotics to  levaquin po for 5 days.


If leukocytosis improves or comes to normal, may dc antibiotics.

## 2018-03-11 NOTE — GENERAL PROGRESS NOTE
Subjective





- Review of Systems


Events since last encounter: 





no fever


no distress 





Objective





- Results


Result Diagrams: 


 03/11/18 06:28





 03/11/18 06:28


Recent Labs: 


 Laboratory Last Values











WBC  17.9 Th/cmm (4.8-10.8)  H  03/11/18  06:28    


 


RBC  5.02 Mil/cmm (4.30-5.70)   03/11/18  06:28    


 


Hgb  14.4 gm/dL (12-16)   03/11/18  06:28    


 


Hct  42.9 % (41.0-60)   03/11/18  06:28    


 


MCV  85.6 fl (80-99)   03/11/18  06:28    


 


MCH  28.7 pg (26.0-30.0)   03/11/18  06:28    


 


MCHC Differential  33.6 pg (28.0-36.0)   03/11/18  06:28    


 


RDW  15.0 % (11.5-20.0)   03/11/18  06:28    


 


Plt Count  252 Th/cmm (150-400)   03/11/18  06:28    


 


MPV  7.9 fl  03/11/18  06:28    


 


Neutrophils %  77.2 % (40.0-80.0)   03/11/18  06:28    


 


Band Neutrophils %  6 % (0-10)   03/09/18  05:21    


 


Lymphocytes %  13.7 % (20.0-50.0)  L  03/11/18  06:28    


 


Monocytes %  8.3 % (2.0-10.0)   03/11/18  06:28    


 


Eosinophils %  0.1 % (0.0-5.0)   03/11/18  06:28    


 


Basophils %  0.7 % (0.0-2.0)   03/11/18  06:28    


 


Neutrophils (Manual)  78 % (40-80)   03/09/18  05:21    


 


Lymphocytes  12 % (20-50)  L  03/09/18  05:21    


 


Monocytes  4 % (2-10)   03/09/18  05:21    


 


Specimen Source  arterial   03/06/18  21:23    


 


Sample Site  RB   03/06/18  21:23    


 


pH  7.44  (7.35-7.45)   03/06/18  21:23    


 


pCO2  38.0 mmHg (35.0-45.0)   03/06/18  21:23    


 


pO2  52.0 mmHg (80.0-100.0)  L  03/06/18  21:23    


 


HCO3  26.0 mEq/L (20.0-26.0)   03/06/18 21:23    


 


Base Excess  1.7 mEq/L (-3.0-3.0)   03/06/18 21:23    


 


O2 Saturation  88.0 % (92.0-100.0)  L  03/06/18  21:23    


 


Phong Test  Positive   03/06/18  21:23    


 


Vent Rate  N/A   03/06/18  21:23    


 


Inspired O2  21   03/06/18  21:23    


 


Tidal Volume  N/A   03/06/18  21:23    


 


PEEP  N/A   03/06/18 21:23    


 


Pressure (ins/psv/peep)  N/A   03/06/18 21:23    


 


Critical Value  MM,RCP   03/06/18 21:23    


 


Sodium  132 mEq/L (136-145)  L  03/11/18  06:28    


 


Potassium  4.3 mEq/L (3.5-5.1)   03/11/18 06:28    


 


Chloride  106 mEq/L ()   03/11/18  06:28    


 


Carbon Dioxide  22.3 mEq/L (21.0-31.0)   03/11/18 06:28    


 


Anion Gap  8.0  (7.0-16.0)   03/11/18 06:28    


 


BUN  21 mg/dL (7-25)   03/11/18 06:28    


 


Creatinine  0.8 mg/dL (0.7-1.3)   03/11/18  06:28    


 


Est GFR ( Amer)  > 60.0 ml/min (>90)   03/11/18  06:28    


 


Est GFR (Non-Af Amer)  > 60.0 ml/min  03/11/18  06:28    


 


BUN/Creatinine Ratio  26.3   03/11/18 06:28    


 


Glucose  202 mg/dL ()  H  03/11/18 06:28    


 


Hemoglobin A1c %  5.5 % (4.0-6.0)   03/09/18  06:10    


 


Whole Bld Lactic Acid  1.57 mmol/L (0.60-1.99)   03/06/18  21:55    


 


Calcium  8.9 mg/dL (8.6-10.3)   03/11/18  06:28    


 


Total Bilirubin  0.3 mg/dL (0.3-1.0)   03/11/18  06:28    


 


AST  12 U/L (13-39)  L  03/11/18  06:28    


 


ALT  20 U/L (7-52)   03/11/18  06:28    


 


Alkaline Phosphatase  73 U/L ()   03/11/18  06:28    


 


Total Protein  6.5 gm/dL (6.0-8.3)   03/11/18  06:28    


 


Albumin  3.8 gm/dL (4.2-5.5)  L  03/11/18  06:28    


 


Globulin  2.7 gm/dL  03/11/18  06:28    


 


Albumin/Globulin Ratio  1.4  (1.0-1.8)   03/11/18  06:28    


 


Influenza A (Rapid)  NEG FOR INF A   03/07/18  13:45    


 


Influenza B (Rapid)  NEG FOR INF B   03/07/18  13:45    


 


Ur L.pneumophila Ag  Negative  (Negative)   03/07/18  21:10    














- Physical Exam


Vitals and I&O: 


 Vital Signs











Temp  97.7 F   03/11/18 04:00


 


Pulse  86   03/11/18 07:30


 


Resp  20   03/11/18 07:30


 


BP  118/82   03/11/18 04:00


 


Pulse Ox  93   03/11/18 07:30








 Intake & Output











 03/10/18 03/11/18 03/11/18





 17:59 06:59 18:59


 


Intake Total   


 


Balance   


 


Weight (lbs)   


 


Intake:   


 


  Oral   


 


Other:   


 


  # Voids   











Active Medications: 


Current Medications





Acetaminophen/Hydrocodone Bitart (Norco 10 Mg/325 Mg)  1 tab PO Q4H PRN


   PRN Reason: Pain (Moderate)


   Stop: 05/07/18 19:04


   Last Admin: 03/11/18 03:21 Dose:  1 tab


Azithromycin 500 mg/ Sodium (Chloride)  250 mls @ 250 mls/hr IV Q24HR [facility]


   Stop: 05/06/18 20:59


   Last Admin: 03/10/18 21:30 Dose:  250 mls/hr


Ceftriaxone Sodium 2 gm/ (Sodium Chloride)  100 mls @ 100 mls/hr IV Q24H [facility]


   Stop: 05/06/18 14:59


   Last Admin: 03/10/18 14:17 Dose:  100 mls/hr


Ipratropium Bromide (Atrovent Neb 0.5mg/2.5ml)  0.5 mg HHN Q4HRT [facility]


   Stop: 05/06/18 02:59


   Last Admin: 03/11/18 07:30 Dose:  Not Given


Loratadine (Claritin)  10 mg PO DAILY PRN


   PRN Reason: Allergy Symptoms


   Stop: 05/06/18 13:55


Methylprednisolone Sodium Succinate (Solu-Medrol)  20 mg IVP Q12HR [facility]


   Stop: 05/09/18 14:49


   Last Admin: 03/11/18 09:44 Dose:  20 mg


Oxymetazoline HCl (Afrin)  1 spr NS Q12HR PRN


   PRN Reason: Nasal Congestion


   Stop: 03/21/18 13:55

## 2018-03-12 LAB
BASOPHILS # BLD AUTO: 0.2 TH/CUMM (ref 0–0.2)
BASOPHILS NFR BLD AUTO: 1.3 % (ref 0–2)
EOSINOPHIL # BLD AUTO: 0 TH/CMM (ref 0.1–0.4)
EOSINOPHIL NFR BLD AUTO: 0.3 % (ref 0–5)
ERYTHROCYTE [DISTWIDTH] IN BLOOD BY AUTOMATED COUNT: 15 % (ref 11.5–20)
HCT VFR BLD CALC: 47.7 % (ref 41–60)
HGB BLD-MCNC: 16 GM/DL (ref 12–16)
LYMPHOCYTE AB SER FC-ACNC: 3.1 TH/CMM (ref 1.5–3)
LYMPHOCYTES NFR BLD AUTO: 21.8 % (ref 20–50)
MCH RBC QN AUTO: 28.6 PG (ref 26–30)
MCHC RBC AUTO-ENTMCNC: 33.5 PG (ref 28–36)
MCV RBC AUTO: 85.3 FL (ref 80–99)
MONOCYTES # BLD AUTO: 1.1 TH/CMM (ref 0.3–1)
MONOCYTES NFR BLD AUTO: 7.6 % (ref 2–10)
NEUTROPHILS # BLD: 9.7 TH/CMM (ref 1.8–8)
NEUTROPHILS NFR BLD AUTO: 69 % (ref 40–80)
PLATELET # BLD: 234 TH/CMM (ref 150–400)
PMV BLD AUTO: 8 FL
RBC # BLD AUTO: 5.6 MIL/CMM (ref 4.3–5.7)
WBC # BLD AUTO: 14.1 TH/CMM (ref 4.8–10.8)

## 2018-03-12 RX ADMIN — IPRATROPIUM BROMIDE SCH MG: 0.5 SOLUTION RESPIRATORY (INHALATION) at 14:22

## 2018-03-12 RX ADMIN — HYDROCODONE BITATRATE AND ACETAMINOPHEN PRN TAB: 10; 325 TABLET ORAL at 03:58

## 2018-03-12 RX ADMIN — IPRATROPIUM BROMIDE SCH MG: 0.5 SOLUTION RESPIRATORY (INHALATION) at 10:28

## 2018-03-12 RX ADMIN — IPRATROPIUM BROMIDE SCH MG: 0.5 SOLUTION RESPIRATORY (INHALATION) at 07:09

## 2018-03-12 RX ADMIN — HYDROCODONE BITATRATE AND ACETAMINOPHEN PRN TAB: 10; 325 TABLET ORAL at 08:30

## 2018-03-12 RX ADMIN — IPRATROPIUM BROMIDE SCH: 0.5 SOLUTION RESPIRATORY (INHALATION) at 02:40

## 2018-03-12 NOTE — GENERAL PROGRESS NOTE
Subjective





- Review of Systems


Events since last encounter: 





no change


no fever








Objective





- Results


Result Diagrams: 


 03/12/18 05:50





 03/11/18 06:28


Recent Labs: 


 Laboratory Last Values











WBC  14.1 Th/cmm (4.8-10.8)  H  03/12/18  05:50    


 


RBC  5.60 Mil/cmm (4.30-5.70)   03/12/18  05:50    


 


Hgb  16.0 gm/dL (12-16)   03/12/18  05:50    


 


Hct  47.7 % (41.0-60)   03/12/18  05:50    


 


MCV  85.3 fl (80-99)   03/12/18  05:50    


 


MCH  28.6 pg (26.0-30.0)   03/12/18  05:50    


 


MCHC Differential  33.5 pg (28.0-36.0)   03/12/18  05:50    


 


RDW  15.0 % (11.5-20.0)   03/12/18  05:50    


 


Plt Count  234 Th/cmm (150-400)   03/12/18  05:50    


 


MPV  8.0 fl  03/12/18  05:50    


 


Neutrophils %  69.0 % (40.0-80.0)   03/12/18  05:50    


 


Band Neutrophils %  6 % (0-10)   03/09/18  05:21    


 


Lymphocytes %  21.8 % (20.0-50.0)   03/12/18  05:50    


 


Monocytes %  7.6 % (2.0-10.0)   03/12/18  05:50    


 


Eosinophils %  0.3 % (0.0-5.0)   03/12/18  05:50    


 


Basophils %  1.3 % (0.0-2.0)   03/12/18  05:50    


 


Neutrophils (Manual)  78 % (40-80)   03/09/18  05:21    


 


Lymphocytes  12 % (20-50)  L  03/09/18  05:21    


 


Monocytes  4 % (2-10)   03/09/18  05:21    


 


Specimen Source  arterial   03/06/18  21:23    


 


Sample Site  RB   03/06/18  21:23    


 


pH  7.44  (7.35-7.45)   03/06/18  21:23    


 


pCO2  38.0 mmHg (35.0-45.0)   03/06/18  21:23    


 


pO2  52.0 mmHg (80.0-100.0)  L  03/06/18  21:23    


 


HCO3  26.0 mEq/L (20.0-26.0)   03/06/18 21:23    


 


Base Excess  1.7 mEq/L (-3.0-3.0)   03/06/18 21:23    


 


O2 Saturation  88.0 % (92.0-100.0)  L  03/06/18  21:23    


 


Phong Test  Positive   03/06/18  21:23    


 


Vent Rate  N/A   03/06/18  21:23    


 


Inspired O2  21   03/06/18  21:23    


 


Tidal Volume  N/A   03/06/18  21:23    


 


PEEP  N/A   03/06/18  21:23    


 


Pressure (ins/psv/peep)  N/A   03/06/18 21:23    


 


Critical Value  MM,RCP   03/06/18 21:23    


 


Sodium  132 mEq/L (136-145)  L  03/11/18  06:28    


 


Potassium  4.3 mEq/L (3.5-5.1)   03/11/18 06:28    


 


Chloride  106 mEq/L ()   03/11/18  06:28    


 


Carbon Dioxide  22.3 mEq/L (21.0-31.0)   03/11/18 06:28    


 


Anion Gap  8.0  (7.0-16.0)   03/11/18 06:28    


 


BUN  21 mg/dL (7-25)   03/11/18 06:28    


 


Creatinine  0.8 mg/dL (0.7-1.3)   03/11/18  06:28    


 


Est GFR ( Amer)  > 60.0 ml/min (>90)   03/11/18 06:28    


 


Est GFR (Non-Af Amer)  > 60.0 ml/min  03/11/18  06:28    


 


BUN/Creatinine Ratio  26.3   03/11/18 06:28    


 


Glucose  202 mg/dL ()  H  03/11/18 06:28    


 


Hemoglobin A1c %  5.5 % (4.0-6.0)   03/09/18  06:10    


 


Whole Bld Lactic Acid  1.57 mmol/L (0.60-1.99)   03/06/18  21:55    


 


Calcium  8.9 mg/dL (8.6-10.3)   03/11/18  06:28    


 


Total Bilirubin  0.3 mg/dL (0.3-1.0)   03/11/18  06:28    


 


AST  12 U/L (13-39)  L  03/11/18  06:28    


 


ALT  20 U/L (7-52)   03/11/18  06:28    


 


Alkaline Phosphatase  73 U/L ()   03/11/18  06:28    


 


Total Protein  6.5 gm/dL (6.0-8.3)   03/11/18  06:28    


 


Albumin  3.8 gm/dL (4.2-5.5)  L  03/11/18  06:28    


 


Globulin  2.7 gm/dL  03/11/18  06:28    


 


Albumin/Globulin Ratio  1.4  (1.0-1.8)   03/11/18  06:28    


 


Influenza A (Rapid)  NEG FOR INF A   03/07/18  13:45    


 


Influenza B (Rapid)  NEG FOR INF B   03/07/18  13:45    


 


Ur L.pneumophila Ag  Negative  (Negative)   03/07/18  21:10    














- Physical Exam


Vitals and I&O: 


 Vital Signs











Temp  97.1 F   03/12/18 04:48


 


Pulse  100   03/12/18 07:10


 


Resp  20   03/12/18 07:10


 


BP  117/71   03/12/18 04:48


 


Pulse Ox  97   03/12/18 07:10








 Intake & Output











 03/11/18 03/12/18 03/12/18





 18:59 06:59 18:59


 


Intake Total 1000 500 


 


Balance 1000 500 


 


Weight (lbs) 98.203 kg 100.062 kg 


 


Intake:   


 


  Oral 1000 500 


 


Other:   


 


  # Voids 3 2 


 


  # Bowel Movements 1  


 


  Stool Characteristics  Soft 





  Formed 











Active Medications: 


Current Medications





Acetaminophen/Hydrocodone Bitart (Norco 10 Mg/325 Mg)  1 tab PO Q4H PRN


   PRN Reason: Pain (Moderate)


   Stop: 05/07/18 19:04


   Last Admin: 03/12/18 08:30 Dose:  1 tab


Levofloxacin (Levaquin Pb)  500 mg in 100 mls @ 100 mls/hr IV Q24HR [facility]


   Stop: 05/11/18 08:59


   Last Admin: 03/12/18 08:33 Dose:  100 mls/hr


Ipratropium Bromide (Atrovent Neb 0.5mg/2.5ml)  0.5 mg HHN Q4HRT [facility]


   Stop: 05/06/18 02:59


   Last Admin: 03/12/18 07:09 Dose:  0.5 mg


Loratadine (Claritin)  10 mg PO DAILY PRN


   PRN Reason: Allergy Symptoms


   Stop: 05/06/18 13:55


Oxymetazoline HCl (Afrin)  1 spr NS Q12HR PRN


   PRN Reason: Nasal Congestion


   Stop: 03/21/18 13:55


Prednisone (Deltasone)  10 mg PO DAILY [facility]


   Stop: 05/11/18 08:59


   Last Admin: 03/12/18 08:32 Dose:  10 mg

## 2018-11-19 ENCOUNTER — HOSPITAL ENCOUNTER (EMERGENCY)
Dept: HOSPITAL 36 - ER | Age: 48
Discharge: HOME | End: 2018-11-19
Payer: MEDICARE
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
## 2018-11-19 DIAGNOSIS — G89.18: ICD-10-CM
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
## 2018-11-19 DIAGNOSIS — S40.022A: Primary | ICD-10-CM

## 2018-11-19 DIAGNOSIS — M79.602: ICD-10-CM

## 2018-11-19 LAB
BASOPHILS # BLD AUTO: 0.1 TH/CUMM (ref 0–0.2)
BASOPHILS NFR BLD AUTO: 0.7 % (ref 0–2)
EOSINOPHIL # BLD AUTO: 0.1 TH/CMM (ref 0.1–0.4)
EOSINOPHIL NFR BLD AUTO: 1.1 % (ref 0–5)
ERYTHROCYTE [DISTWIDTH] IN BLOOD BY AUTOMATED COUNT: 14 % (ref 11.5–20)
HCT VFR BLD CALC: 40.1 % (ref 41–60)
HGB BLD-MCNC: 13.8 GM/DL (ref 12–16)
LYMPHOCYTE AB SER FC-ACNC: 2.4 TH/CMM (ref 1.5–3)
LYMPHOCYTES NFR BLD AUTO: 27 % (ref 20–50)
MCH RBC QN AUTO: 29.2 PG (ref 26–30)
MCHC RBC AUTO-ENTMCNC: 34.5 PG (ref 28–36)
MCV RBC AUTO: 84.6 FL (ref 80–99)
MONOCYTES # BLD AUTO: 0.5 TH/CMM (ref 0.3–1)
MONOCYTES NFR BLD AUTO: 6.1 % (ref 2–10)
NEUTROPHILS # BLD: 5.7 TH/CMM (ref 1.8–8)
NEUTROPHILS NFR BLD AUTO: 65.1 % (ref 40–80)
PLATELET # BLD: 348 TH/CMM (ref 150–400)
PMV BLD AUTO: 7.5 FL
RBC # BLD AUTO: 4.74 MIL/CMM (ref 4.3–5.7)
WBC # BLD AUTO: 8.8 TH/CMM (ref 4.8–10.8)

## 2018-11-19 PROCEDURE — Z7502: HCPCS

## 2019-02-16 ENCOUNTER — HOSPITAL ENCOUNTER (EMERGENCY)
Dept: HOSPITAL 36 - ER | Age: 49
Discharge: HOME | End: 2019-02-16
Payer: MEDICARE

## 2019-02-16 DIAGNOSIS — J40: Primary | ICD-10-CM

## 2019-02-16 PROCEDURE — Z7502: HCPCS

## 2019-02-16 NOTE — ED PHYSICIAN CHART
ED Chief Complaint/HPI





- Patient Information


Date Seen:: 02/16/19


Time Seen:: 03:24


Chief Complaint:: cough


History of Present Illness:: 





48 yr old male with cough congestion for few days wants abs has been taking 

cough medicine


Allergies:: 


 Allergies











Allergy/AdvReac Type Severity Reaction Status Date / Time


 


No Known Allergies Allergy   Verified 02/16/19 03:13











Vitals:: 


 Vital Signs - 8 hr











  02/16/19





  03:05


 


Temp 98.1 F


 


HR 65


 


RR 20


 


/79


 


O2 Sat % 96














ED Review of Systems





- Review of Systems


General/Constitutional: No fever, No chills, No weight loss, No weakness, No 

diaphoresis, No edema, No loss of appetite


Skin: No skin lesions, No rash, No bruising


Head: No headache, No light-headedness


Eyes: No loss of vision, No pain, No diplopia


ENT: No earache, No nasal drainage, No sore throat, No tinnitus


Neck: No neck pain, No swelling, No thyromegaly, No stiffness, No mass noted


Cardio Vascular: No chest pain, No palpitations, No PND, No orthopnea, No edema


Pulmonary: Cough


GI: Diarrhea


G/U: No dysuria, No frequency, No hematuria


Musculoskeletal: No bone or joint pain, No back pain, No muscle pain


Endocrine: No polyuria, No polydipsia


Psychiatric: No prior psych history, No depression, No anxiety, No suicidal 

ideation


Hematopoietic: No bruising, No lymphadenopathy


Allergic/Immuno: No urticaria, No angioedema


Neurological: No syncope, No focal symptoms, No weakness, No paresthesia, No 

headache, No seizure, No dizziness, No confusion, No vertigo





ED Past Medical History





- Past Medical History


Past Medical History: Other (non hodkins lymphoma in remission)


Surgical History: other (lt aka)





Family Medical History





- Family Member


  ** Mother


History Unknown: Yes


Ethnicity: 


Living Status: Still Living


Hx Family Cancer: No


Hx Family Coronary Artery Disease: No


Hx Family Congestive Heart Failure: No


Hx Family Hypertension: No


Hx Family Stroke: No


Hx Family Diabetes: No


Hx Family Seizures: No


Hx Family Dementia: No


Hx Family AIDS: No


Hx Family HIV: No


Hx Family COPD: No


Hx Family Hepatitis: No


Hx Family Psychiatric Problems: No


Hx Family Tuberculosis: No





ED Physical Exam





- Physical Examination


General/Constitutional: Awake, Well-developed, well-nourished, Alert, No 

distress, GCS 15, Non-toxic appearing, Ambulatory


Head: Atraumatic


Eyes: Lids, conjuctiva normal, PERRL, EOMI


Skin: Nl inspection, No rash, No skin lesions, No ecchymosis, Well hydrated, No 

lymphadenopathy


ENMT: External ears, nose nl, Nasal exam nl, Lips, teeth, gums nl


Neck: Nontender, No JVD, No nuchal rigidity, No bruit, No mass, No stridor


Respiratory: Nl effort/Exclusion, Clear to Auscultation, No Wheeze/Rhonchi/Rales


Cardio Vascular: RRR, No murmur, gallop, rubs, NL S1 S2


GI: No tenderness/rebounding/guarding, No organomegaly, No hernia, Normal BS's, 

Nondistended, No mass/bruits, No McBurney tenderness


: No CVA tenderness


Extremities: No tenderness or effusion, No edema


Other Extremities comments:: 





lt aka


Neuro/Psych: Alert/oriented, DTR's symmetric, Normal sensory exam, Normal motor 

strength, Judgement/insight normal, Mood normal, Normal gait, No focal deficits


Misc: Normal back, No paraspinal tenderness





ED Assessment





- Assessment


General Assessment: 





bronchitis





ED Septic Shock





- .


Is Septic Shock (SBP<90, OR Lactate>4 mmol\L) present?: No





- <6hrs of presentation:


Vital Signs: 


 Vital Signs - 8 hr











  02/16/19





  03:05


 


Temp 98.1 F


 


HR 65


 


RR 20


 


/79


 


O2 Sat % 96














ED Reassessment (Disposition)





- Reassessment


Reassessment:: 





bronchitis





- Diagnosis


Diagnosis:: 





bronchitis





- Aftercare/Follow up Instructions


Medication Prescribed:: 





zpack





- Patient Disposition


Discharge/Transfer:: Home


Condition at Disposition:: Stable

## 2019-07-10 ENCOUNTER — HOSPITAL ENCOUNTER (INPATIENT)
Dept: HOSPITAL 36 - ER | Age: 49
LOS: 5 days | Discharge: HOME | DRG: 74 | End: 2019-07-15
Attending: FAMILY MEDICINE | Admitting: FAMILY MEDICINE
Payer: MEDICARE

## 2019-07-10 VITALS — SYSTOLIC BLOOD PRESSURE: 103 MMHG | DIASTOLIC BLOOD PRESSURE: 74 MMHG

## 2019-07-10 DIAGNOSIS — E11.40: ICD-10-CM

## 2019-07-10 DIAGNOSIS — G89.4: ICD-10-CM

## 2019-07-10 DIAGNOSIS — Z89.512: ICD-10-CM

## 2019-07-10 DIAGNOSIS — M79.2: Primary | ICD-10-CM

## 2019-07-10 DIAGNOSIS — F17.210: ICD-10-CM

## 2019-07-10 DIAGNOSIS — R53.1: ICD-10-CM

## 2019-07-10 DIAGNOSIS — F41.9: ICD-10-CM

## 2019-07-10 DIAGNOSIS — R00.0: ICD-10-CM

## 2019-07-10 LAB
ALBUMIN SERPL-MCNC: 3.8 GM/DL (ref 4.2–5.5)
ALBUMIN/GLOB SERPL: 1.4 {RATIO} (ref 1–1.8)
ALP SERPL-CCNC: 112 U/L (ref 34–104)
ALT SERPL-CCNC: 54 U/L (ref 7–52)
ANION GAP SERPL CALC-SCNC: 12.1 MMOL/L (ref 7–16)
AST SERPL-CCNC: 39 U/L (ref 13–39)
BASOPHILS # BLD AUTO: 0 TH/CUMM (ref 0–0.2)
BASOPHILS NFR BLD AUTO: 0.3 % (ref 0–2)
BILIRUB SERPL-MCNC: 0.3 MG/DL (ref 0.3–1)
BUN SERPL-MCNC: 11 MG/DL (ref 7–25)
CALCIUM SERPL-MCNC: 8.8 MG/DL (ref 8.6–10.3)
CHLORIDE SERPL-SCNC: 105 MEQ/L (ref 98–107)
CO2 SERPL-SCNC: 23.9 MEQ/L (ref 21–31)
CREAT SERPL-MCNC: 1 MG/DL (ref 0.7–1.3)
EOSINOPHIL # BLD AUTO: 0.2 TH/CMM (ref 0.1–0.4)
EOSINOPHIL NFR BLD AUTO: 2.1 % (ref 0–5)
ERYTHROCYTE [DISTWIDTH] IN BLOOD BY AUTOMATED COUNT: 14.2 % (ref 11.5–20)
GLOBULIN SER-MCNC: 2.7 GM/DL
GLUCOSE SERPL-MCNC: 102 MG/DL (ref 70–105)
HCT VFR BLD CALC: 43.3 % (ref 41–60)
HGB BLD-MCNC: 14.4 GM/DL (ref 12–16)
LYMPHOCYTE AB SER FC-ACNC: 0.9 TH/CMM (ref 1.5–3)
LYMPHOCYTES NFR BLD AUTO: 11.9 % (ref 20–50)
MCH RBC QN AUTO: 27.5 PG (ref 26–30)
MCHC RBC AUTO-ENTMCNC: 33.3 PG (ref 28–36)
MCV RBC AUTO: 82.6 FL (ref 80–99)
MONOCYTES # BLD AUTO: 0.5 TH/CMM (ref 0.3–1)
MONOCYTES NFR BLD AUTO: 7 % (ref 2–10)
NEUTROPHILS # BLD: 6.1 TH/CMM (ref 1.8–8)
NEUTROPHILS NFR BLD AUTO: 78.7 % (ref 40–80)
PLATELET # BLD: 194 TH/CMM (ref 150–400)
POTASSIUM SERPL-SCNC: 4 MEQ/L (ref 3.5–5.1)
RBC # BLD AUTO: 5.24 MIL/CMM (ref 4.3–5.7)
SODIUM SERPL-SCNC: 137 MEQ/L (ref 136–145)
WBC # BLD AUTO: 7.7 TH/CMM (ref 4.8–10.8)

## 2019-07-10 PROCEDURE — Z7610: HCPCS

## 2019-07-10 RX ADMIN — HYDROMORPHONE HYDROCHLORIDE PRN MG: 2 INJECTION INTRAMUSCULAR; INTRAVENOUS; SUBCUTANEOUS at 16:09

## 2019-07-10 RX ADMIN — HYDROMORPHONE HYDROCHLORIDE PRN MG: 1 INJECTION, SOLUTION INTRAMUSCULAR; INTRAVENOUS; SUBCUTANEOUS at 22:16

## 2019-07-10 NOTE — ED PHYSICIAN CHART
ED Chief Complaint/HPI





- Patient Information


Date Seen:: 07/10/19


Time Seen:: 13:25


Chief Complaint:: Left Thigh Pain


History of Present Illness:: 





onset x 3 days of Left LE pain, paresthesias, and weakness; pt denies trauma, H/

As, S/T, neck pain, C/P, SOB, Abd. Pain, A/N/V/D/C, fever, chills, or urinary s/

s; pt's last tetanus shot: < 5 year; UTD; Hx of chronic pain syndrome; S/P left 

BKA


Allergies:: 


 Allergies











Allergy/AdvReac Type Severity Reaction Status Date / Time


 


No Known Allergies Allergy   Verified 02/16/19 03:13











Vitals:: 


 Vital Signs - 8 hr











  07/10/19 07/10/19 07/10/19





  13:25 14:03 14:45


 


Temp 97.9 F 97.9 F 97.9 F


 


 115 115


 


RR 22 21 21


 


/83 127/73 127/73


 


O2 Sat % 95 95 95











Historian:: Patient, Friend


Review:: Nurse's Note Reviewed, Old Chart Reviewed





ED Review of Systems





- Review of Systems


General/Constitutional: No fever, No chills, No weight loss, Weakness, No 

diaphoresis, No edema, No loss of appetite


Skin: No skin lesions, No rash, No bruising


Head: No headache, No light-headedness


Eyes: No loss of vision, No pain, No diplopia


ENT: No earache, No nasal drainage, No sore throat, No tinnitus


Neck: No neck pain, No swelling, No thyromegaly, No stiffness, No mass noted


Cardio Vascular: No chest pain, No palpitations, No PND, No orthopnea, No edema


Pulmonary: No SOB, No cough, No sputum, No wheezing


GI: No nausea, No vomiting, No diarrhea, No pain, No melena, No hematochezia, 

No constipation, No hematemesis


G/U: No dysuria, No frequency, No hematuria, No nacturia


Musculoskeletal: No bone or joint pain, No back pain, Muscle pain


Endocrine: No polyuria, No polydipsia


Psychiatric: No prior psych history, No depression, No anxiety, No suicidal 

ideation, No homicidal ideation, No auditory hallucination, No visual 

hallucination


Hematopoietic: No bruising, No lymphadenopathy


Allergic/Immuno: No urticaria, No angioedema


Neurological: No syncope, No focal symptoms, Weakness, Paresthesia, No headache

, No seizure, No dizziness, No confusion, No vertigo





ED Past Medical History





- Past Medical History


Obtainable: Yes


Past Medical History: DM


Family History: Diabetes Melitus


Social History: Smoker, No Alcohol, No Drug Use, Single


Surgical History: other (left BKA)


Psychiatricy History: None


Medication: Reviewed





Family Medical History





- Family Member


  ** Mother


History Unknown: Yes


Ethnicity: 


Living Status: Still Living


Hx Family Cancer: No


Hx Family Coronary Artery Disease: No


Hx Family Congestive Heart Failure: No


Hx Family Hypertension: No


Hx Family Stroke: No


Hx Family Diabetes: No


Hx Family Seizures: No


Hx Family Dementia: No


Hx Family AIDS: No


Hx Family HIV: No


Hx Family COPD: No


Hx Family Hepatitis: No


Hx Family Psychiatric Problems: No


Hx Family Tuberculosis: No


Other Medical History: NO MED PROBLEMS





ED Physical Exam





- Physical Examination


General/Constitutional: Awake, Well-developed, well-nourished, Alert, No 

distress, GCS 15, Non-toxic appearing, Ambulatory


Head: Atraumatic


Eyes: Lids, conjuctiva normal, PERRL, EOMI


Skin: Nl inspection, No rash, No skin lesions, No ecchymosis, Well hydrated, No 

lymphadenopathy


ENMT: External ears, nose nl, TM canals nl, Nasal exam nl, Lips, teeth, gums nl

, Oropharynx nl, Tonsils nl


Neck: Nontender, Full ROM w/o pain, No JVD, No nuchal rigidity, No bruit, No 

mass, No stridor


Respiratory: Nl effort/Exclusion, Clear to Auscultation, No Wheeze/Rhonchi/Rales


Cardio Vascular: RRR, No murmur, gallop, rubs, NL S1 S2, Carotid/Femoral/Distal 

pulses equal bilaterally


GI: No tenderness/rebounding/guarding, No organomegaly, No hernia, Normal BS's, 

Nondistended, No mass/bruits, No McBurney tenderness


: No CVA tenderness


Extremities: No tenderness or effusion, Full ROM, normal strength in all 

extremities, No edema, Normal digits & nails


Other Extremities comments:: 





S/P: BKA


Neuro/Psych: Alert/oriented, DTR's symmetric, Normal sensory exam, Normal motor 

strength, Judgement/insight normal, Mood normal, Normal gait, No focal deficits


Misc: Normal back, No paraspinal tenderness





ED Labs/Radiology/EKG Results





- Lab Results


Comments:: 





Reviewed





ED Septic Shock





- .


Is Septic Shock (SBP<90, OR Lactate>4 mmol\L) present?: No





- <6hrs of presentation:


Vital Signs: 


 Vital Signs - 8 hr











  07/10/19 07/10/19 07/10/19





  13:25 14:03 14:45


 


Temp 97.9 F 97.9 F 97.9 F


 


 115 115


 


RR 22 21 21


 


/83 127/73 127/73


 


O2 Sat % 95 95 95














ED Reassessment (Disposition)





- Reassessment


Reassessment Condition:: Improved





- Diagnosis


Diagnosis:: 





S/P: BKA; Chronic Pain Syndrome; Neuropathy; Intractable Pain; Sciatica; 

Neuritis; Tachycardia





- Aftercare/Follow up Instructions


Aftercare/Follow-Up Instructions:: Counseled pt regarding lab results/diagnosis 

& need follow up, Counseled pt & family regarding lab results/diagnosis & need 

follow up





- Patient Disposition


Discharge/Transfer:: Acute Care w/in this hosp


Accepting Physician:: Dr. Diego


Time Called:: [id number]


Time Responded:: 14:30


Admitted to:: Telemetry


Spoke to:: Dr. Diego


Admitting Medical Physician:: Dr. Diego


Condition at Disposition:: Stable, Improved

## 2019-07-11 LAB
APPEARANCE UR: CLEAR
BILIRUB UR-MCNC: NEGATIVE MG/DL
COLOR UR: YELLOW
GLUCOSE UR STRIP-MCNC: NEGATIVE MG/DL
KETONES UR STRIP-MCNC: NEGATIVE MG/DL
LEUKOCYTE ESTERASE UR-ACNC: NEGATIVE
MICRO URNS: NO
NITRITE UR QL STRIP: NEGATIVE
PH UR STRIP: 6 [PH] (ref 4.6–8)
PROT UR STRIP-MCNC: NEGATIVE MG/DL
RBC # UR STRIP: NEGATIVE /UL
SP GR UR STRIP: 1.01 (ref 1–1.03)
URINALYSIS COMPLETE PNL UR: (no result)
UROBILINOGEN UR STRIP-ACNC: 0.2 E.U./DL (ref 0.2–1)

## 2019-07-11 RX ADMIN — HYDROMORPHONE HYDROCHLORIDE PRN MG: 1 INJECTION, SOLUTION INTRAMUSCULAR; INTRAVENOUS; SUBCUTANEOUS at 06:26

## 2019-07-11 RX ADMIN — HYDROMORPHONE HYDROCHLORIDE PRN MG: 1 INJECTION, SOLUTION INTRAMUSCULAR; INTRAVENOUS; SUBCUTANEOUS at 02:26

## 2019-07-11 RX ADMIN — HYDROMORPHONE HYDROCHLORIDE PRN MG: 2 INJECTION INTRAMUSCULAR; INTRAVENOUS; SUBCUTANEOUS at 21:18

## 2019-07-11 NOTE — HISTORY & PHYSICAL
ADMIT DATE:  07/11/2019



DICTATED FOR:  Dr. Diego.



CHIEF COMPLAINT:  Phantom pain.



HISTORY OF PRESENT ILLNESS:  This is a 49-year-old male who is admitted to the

Med/Surg Unit due to a 3-day history of left stump pain.  The patient states

that he is status post amputation 2 years ago.  The patient states that ever

since that he has had this he has been having unbearable pain.  For this reason,

the patient is admitted.



PAST MEDICAL HISTORY:  Diabetes.



FAMILY HISTORY:  Noncontributory.



SOCIAL HISTORY:  The patient denies any alcohol.  The patient is a former

smoker.



SURGICAL HISTORY:  Left BKA 2 years ago.



MEDICATIONS:  See medication list.



REVIEW OF SYSTEMS:

GENERAL:  Denies any fever or chills.

CARDIOVASCULAR:  Denies chest pain.

RESPIRATORY:  Denies shortness of breath.

GASTROINTESTINAL:  Denies nausea, vomiting, abdominal pain.

GENITOURINARY:  Denies increased frequency or dysuria.

NEUROLOGIC:  Denies headache, seizure or syncope.

All systems reviewed and negative.



PHYSICAL EXAMINATION:

GENERAL:  The patient is a well-developed, well-nourished, in no apparent

distress.

VITAL SIGNS:  Temperature 99.3, heart rate 102, blood pressure 103/66,

respirations 19, O2 97%.

HEENT:  Head normocephalic, atraumatic.

NECK:  Supple.  No mass.

LUNGS:  Clear bilaterally.

HEART:  Regular rhythm.

ABDOMEN:  Soft, nontender.



LABORATORY DATA:  WBC 7.7, H and H 14.4 and 43.3, platelet of 194.  Sodium 137,

potassium 4.0, chloride 105, BUN 11, creatinine 1.0, albumin at 3.8.



Urinalysis negative for any UTI.



ASSESSMENT:  Neuritis, chronic pain syndrome, history of diabetes, generalized

weakness, status post left below knee amputation 2 years ago.



PLAN:  The patient to be admitted to Med/Surg Unit.  Pain management and wound

care consult as well.  We will continue to monitor this patient.  If no further

pain, the patient to be discharged in a.m.





DD: 07/11/2019 12:43

DT: 07/11/2019 13:41

JOB# [id number]  [id number]

## 2019-07-12 LAB
ANION GAP SERPL CALC-SCNC: 12.5 MMOL/L (ref 7–16)
BASOPHILS # BLD AUTO: 0 TH/CUMM (ref 0–0.2)
BASOPHILS NFR BLD AUTO: 0.2 % (ref 0–2)
BUN SERPL-MCNC: 9 MG/DL (ref 7–25)
CALCIUM SERPL-MCNC: 8.7 MG/DL (ref 8.6–10.3)
CHLORIDE SERPL-SCNC: 102 MEQ/L (ref 98–107)
CO2 SERPL-SCNC: 23.4 MEQ/L (ref 21–31)
CREAT SERPL-MCNC: 1 MG/DL (ref 0.7–1.3)
EOSINOPHIL # BLD AUTO: 0 TH/CMM (ref 0.1–0.4)
EOSINOPHIL NFR BLD AUTO: 0.2 % (ref 0–5)
ERYTHROCYTE [DISTWIDTH] IN BLOOD BY AUTOMATED COUNT: 14 % (ref 11.5–20)
GLUCOSE SERPL-MCNC: 188 MG/DL (ref 70–105)
HCT VFR BLD CALC: 42.5 % (ref 41–60)
HGB BLD-MCNC: 14.4 GM/DL (ref 12–16)
LYMPHOCYTE AB SER FC-ACNC: 1.3 TH/CMM (ref 1.5–3)
LYMPHOCYTES NFR BLD AUTO: 19.3 % (ref 20–50)
MCH RBC QN AUTO: 28.1 PG (ref 26–30)
MCHC RBC AUTO-ENTMCNC: 33.9 PG (ref 28–36)
MCV RBC AUTO: 82.9 FL (ref 80–99)
MONOCYTES # BLD AUTO: 0.6 TH/CMM (ref 0.3–1)
MONOCYTES NFR BLD AUTO: 9.3 % (ref 2–10)
NEUTROPHILS # BLD: 4.9 TH/CMM (ref 1.8–8)
NEUTROPHILS NFR BLD AUTO: 71 % (ref 40–80)
PLATELET # BLD: 153 TH/CMM (ref 150–400)
POTASSIUM SERPL-SCNC: 3.9 MEQ/L (ref 3.5–5.1)
RBC # BLD AUTO: 5.12 MIL/CMM (ref 4.3–5.7)
SODIUM SERPL-SCNC: 134 MEQ/L (ref 136–145)
WBC # BLD AUTO: 6.8 TH/CMM (ref 4.8–10.8)

## 2019-07-12 RX ADMIN — HYDROMORPHONE HYDROCHLORIDE PRN MG: 2 INJECTION INTRAMUSCULAR; INTRAVENOUS; SUBCUTANEOUS at 05:22

## 2019-07-12 RX ADMIN — HYDROMORPHONE HYDROCHLORIDE PRN MG: 2 INJECTION INTRAMUSCULAR; INTRAVENOUS; SUBCUTANEOUS at 23:49

## 2019-07-12 RX ADMIN — HYDROMORPHONE HYDROCHLORIDE PRN MG: 2 INJECTION INTRAMUSCULAR; INTRAVENOUS; SUBCUTANEOUS at 20:07

## 2019-07-12 NOTE — INTERNAL MEDICINE PROG NOTE
Internal Medicine Subjective





- Subjective


Service Date: 07/12/19


Patient seen and examined:: with staff


Patient is:: awake


Per staff patient has:: tolerating meds





Internal Medicine Objective





- Results


Result Diagrams: 


 07/12/19 04:35





 07/12/19 04:35


Recent Labs: 


 Laboratory Last Values











WBC  6.8 Th/cmm (4.8-10.8)   07/12/19  04:35    


 


RBC  5.12 Mil/cmm (4.30-5.70)   07/12/19  04:35    


 


Hgb  14.4 gm/dL (12-16)   07/12/19  04:35    


 


Hct  42.5 % (41.0-60)   07/12/19  04:35    


 


MCV  82.9 fl (80-99)   07/12/19  04:35    


 


MCH  28.1 pg (26.0-30.0)   07/12/19  04:35    


 


MCHC Differential  33.9 pg (28.0-36.0)   07/12/19  04:35    


 


RDW  14.0 % (11.5-20.0)   07/12/19  04:35    


 


Plt Count  153 Th/cmm (150-400)   07/12/19  04:35    


 


MPV  8.6 fl  07/12/19  04:35    


 


Neutrophils %  71.0 % (40.0-80.0)   07/12/19  04:35    


 


Lymphocytes %  19.3 % (20.0-50.0)  L  07/12/19  04:35    


 


Monocytes %  9.3 % (2.0-10.0)   07/12/19  04:35    


 


Eosinophils %  0.2 % (0.0-5.0)   07/12/19  04:35    


 


Basophils %  0.2 % (0.0-2.0)   07/12/19  04:35    


 


Sodium  134 mEq/L (136-145)  L  07/12/19  04:35    


 


Potassium  3.9 mEq/L (3.5-5.1)   07/12/19  04:35    


 


Chloride  102 mEq/L ()   07/12/19  04:35    


 


Carbon Dioxide  23.4 mEq/L (21.0-31.0)   07/12/19  04:35    


 


Anion Gap  12.5  (7.0-16.0)   07/12/19  04:35    


 


BUN  9 mg/dL (7-25)   07/12/19  04:35    


 


Creatinine  1.0 mg/dL (0.7-1.3)   07/12/19  04:35    


 


Est GFR ( Amer)  > 60.0 ml/min (>90)   07/12/19  04:35    


 


Est GFR (Non-Af Amer)  > 60.0 ml/min  07/12/19  04:35    


 


BUN/Creatinine Ratio  9.0   07/12/19  04:35    


 


Glucose  188 mg/dL ()  H  07/12/19  04:35    


 


POC Glucose  107 MG/DL (70 - 105)  H  07/10/19  16:19    


 


Calcium  8.7 mg/dL (8.6-10.3)   07/12/19  04:35    


 


Total Bilirubin  0.3 mg/dL (0.3-1.0)   07/10/19  15:35    


 


AST  39 U/L (13-39)   07/10/19  15:35    


 


ALT  54 U/L (7-52)  H  07/10/19  15:35    


 


Alkaline Phosphatase  112 U/L ()  H  07/10/19  15:35    


 


Troponin I  < 0.01 ng/mL (0.01-0.05)  L  07/10/19  15:35    


 


Total Protein  6.5 gm/dL (6.0-8.3)   07/10/19  15:35    


 


Albumin  3.8 gm/dL (4.2-5.5)  L  07/10/19  15:35    


 


Globulin  2.7 gm/dL  07/10/19  15:35    


 


Albumin/Globulin Ratio  1.4  (1.0-1.8)   07/10/19  15:35    


 


Urine Source  CLEAN C   07/11/19  02:42    


 


Urine Color  YELLOW   07/11/19  02:42    


 


Urine Clarity  CLEAR  (CLEAR)   07/11/19  02:42    


 


Urine pH  6.0  (4.6 - 8.0)   07/11/19  02:42    


 


Ur Specific Gravity  1.015  (1.005-1.030)   07/11/19  02:42    


 


Urine Protein  NEGATIVE mg/dL (NEGATIVE)   07/11/19  02:42    


 


Urine Glucose (UA)  NEGATIVE mg/dL (NEGATIVE)   07/11/19  02:42    


 


Urine Ketones  NEGATIVE mg/dL (NEGATIVE)   07/11/19  02:42    


 


Urine Blood  NEGATIVE  (NEGATIVE)   07/11/19  02:42    


 


Urine Nitrate  NEGATIVE  (NEGATIVE)   07/11/19  02:42    


 


Urine Bilirubin  NEGATIVE  (NEGATIVE)   07/11/19  02:42    


 


Urine Urobilinogen  0.2 E.U./dL (0.2 - 1.0)   07/11/19  02:42    


 


Ur Leukocyte Esterase  NEGATIVE  (NEGATIVE)   07/11/19  02:42    














- Physical Exam


Vitals and I&O: 


 Vital Signs











Temp  100.1 F   07/12/19 16:00


 


Pulse  109   07/12/19 16:00


 


Resp  18   07/12/19 16:00


 


BP  103/65   07/12/19 16:00


 


Pulse Ox  96   07/12/19 16:00








 Intake & Output











 07/11/19 07/12/19 07/12/19





 18:59 06:59 18:59


 


Intake Total 720 400 


 


Output Total 600 1000 


 


Balance 120 -600 


 


Weight (lbs) 200 lb 215 lb 


 


Intake:   


 


  Oral 720 400 


 


Output:   


 


  Urine 600 1000 


 


Other:   


 


  # Voids 2  


 


  Weight Source Bedscale Estimated 











Active Medications: 


Current Medications





Acetaminophen (Tylenol)  325 mg PO Q6H PRN


   PRN Reason: Fever > 101


   Stop: 09/09/19 12:43


   Last Admin: 07/12/19 05:20 Dose:  325 mg


Hydromorphone HCl (Dilaudid)  1 mg IVP Q4HR PRN


   PRN Reason: Pain (Moderate)


   Stop: 09/08/19 15:22


   Last Admin: 07/11/19 06:26 Dose:  1 mg


Hydromorphone HCl (Dilaudid)  2 mg IVP Q4HR PRN


   PRN Reason: Pain (Severe)


   Stop: 09/08/19 15:22


   Last Admin: 07/12/19 05:22 Dose:  2 mg


Ondansetron HCl (Zofran)  4 mg IV Q6H PRN


   PRN Reason: Nausea / Vomiting


   Stop: 09/08/19 15:23








General: alert


HEENT: NC/AT, PERRLA


Neck: Supple


Lungs: CTAB


Cardiovascular: RRR


Abdomen: soft, non-tender, non-distended


Extremities: excoriation


Neurological: unable to follow command





Internal Medicine Assmt/Plan





- Assessment


Assessment: 





neuritis


chronic pain


hx dm


generalized weakness


left bka


anxiety








- Plan


Plan: 





prn ativan 


pain mgmt


cpm

## 2019-07-13 LAB
AMPHET UR-MCNC: NEGATIVE NG/ML
BARBITURATES UR-MCNC: NEGATIVE UG/ML
BENZODIAZEPINES PNL UR: POSITIVE
CANNABINOIDS SERPL QL CFM: NEGATIVE
COCAINE METAB.OTHER UR-MCNC: NEGATIVE NG/ML
HBA1C BLD-MCNC: 6.6 % (ref 4.8–5.6)
METHADONE UR CFM-MCNC: NEGATIVE NG/ML
METHAMPHET UR QL: NEGATIVE
OPIATES UR QL: POSITIVE
PCP UR-MCNC: NEGATIVE UG/L
TRICYCLICS UR QL: NEGATIVE

## 2019-07-13 RX ADMIN — HYDROMORPHONE HYDROCHLORIDE PRN MG: 2 INJECTION INTRAMUSCULAR; INTRAVENOUS; SUBCUTANEOUS at 04:05

## 2019-07-13 RX ADMIN — HYDROMORPHONE HYDROCHLORIDE PRN MG: 2 INJECTION INTRAMUSCULAR; INTRAVENOUS; SUBCUTANEOUS at 10:04

## 2019-07-13 RX ADMIN — HYDROMORPHONE HYDROCHLORIDE PRN MG: 2 INJECTION INTRAMUSCULAR; INTRAVENOUS; SUBCUTANEOUS at 16:16

## 2019-07-13 RX ADMIN — HYDROMORPHONE HYDROCHLORIDE PRN MG: 2 INJECTION INTRAMUSCULAR; INTRAVENOUS; SUBCUTANEOUS at 23:55

## 2019-07-13 RX ADMIN — HYDROMORPHONE HYDROCHLORIDE PRN MG: 2 INJECTION INTRAMUSCULAR; INTRAVENOUS; SUBCUTANEOUS at 20:09

## 2019-07-13 NOTE — PROGRESS NOTES
DATE:  07/13/2019



SUBJECTIVE:  The patient was seen in his room.  The patient is asleep.  Provider

trying to interview the patient, but the patient appears to be drowsy and does

not want to have a discussion with the provider.  Otherwise, the patient appears

to be in no acute distress.  No other issue or episodes reported from staff.



OBJECTIVE:

VITAL SIGNS:  Temperature 99.2, heart rate 66, blood pressure 98/65,

respirations of 20, and 98% on room air.

HEENT:  Head is atraumatic and normocephalic.  Eyes:  Bilateral conjunctivae are

clear.  Bilateral pupils are equally round and reactive.

NECK:  Supple.  No JVD.

CARDIOVASCULAR:  S1, S2, without murmur.

PULMONARY:  Clear to auscultation.

GASTROINTESTINAL:  Soft and nontender without guarding.  Positive bowel sounds.

MUSCULOSKELETAL:  No clubbing.  No cyanosis.  Positive left below knee

amputation.



ASSESSMENT:

1.  Chronic pain syndrome.

2.  Left below knee amputation.

3.  Anxiety.

4.  History of diabetes.



PLAN:  We will continue pain management as needed.  We may initiate the

discharge planning.  Treatment plans were discussed with the patient's nurse. 

Treatment plans were discussed with Dr. Diego.





DD: 07/13/2019 07:15

DT: 07/13/2019 12:21

JOB# [id number]  [id number]

## 2019-07-14 RX ADMIN — DEXTROMETHORPHAN HBR AND GUAIFENESIN SCH ML: 10; 100 SOLUTION ORAL at 20:26

## 2019-07-14 NOTE — INTERNAL MEDICINE PROG NOTE
Internal Medicine Subjective





- Subjective


Patient is:: awake, verbal, in bed, talking


Patient Complaints of:: other (reports felt anxious overnight and received anti-

anxiety med)


Per staff patient has:: no adverse event, no episodes of fall, tolerating meds





Internal Medicine Objective





- Results


Result Diagrams: 


 07/12/19 04:35





 07/12/19 04:35


Recent Labs: 


 Laboratory Last Values











WBC  6.8 Th/cmm (4.8-10.8)   07/12/19  04:35    


 


RBC  5.12 Mil/cmm (4.30-5.70)   07/12/19  04:35    


 


Hgb  14.4 gm/dL (12-16)   07/12/19  04:35    


 


Hct  42.5 % (41.0-60)   07/12/19  04:35    


 


MCV  82.9 fl (80-99)   07/12/19  04:35    


 


MCH  28.1 pg (26.0-30.0)   07/12/19  04:35    


 


MCHC Differential  33.9 pg (28.0-36.0)   07/12/19  04:35    


 


RDW  14.0 % (11.5-20.0)   07/12/19  04:35    


 


Plt Count  153 Th/cmm (150-400)   07/12/19  04:35    


 


MPV  8.6 fl  07/12/19  04:35    


 


Neutrophils %  71.0 % (40.0-80.0)   07/12/19  04:35    


 


Lymphocytes %  19.3 % (20.0-50.0)  L  07/12/19  04:35    


 


Monocytes %  9.3 % (2.0-10.0)   07/12/19  04:35    


 


Eosinophils %  0.2 % (0.0-5.0)   07/12/19  04:35    


 


Basophils %  0.2 % (0.0-2.0)   07/12/19  04:35    


 


Sodium  134 mEq/L (136-145)  L  07/12/19  04:35    


 


Potassium  3.9 mEq/L (3.5-5.1)   07/12/19  04:35    


 


Chloride  102 mEq/L ()   07/12/19  04:35    


 


Carbon Dioxide  23.4 mEq/L (21.0-31.0)   07/12/19  04:35    


 


Anion Gap  12.5  (7.0-16.0)   07/12/19  04:35    


 


BUN  9 mg/dL (7-25)   07/12/19  04:35    


 


Creatinine  1.0 mg/dL (0.7-1.3)   07/12/19  04:35    


 


Est GFR ( Amer)  > 60.0 ml/min (>90)   07/12/19  04:35    


 


Est GFR (Non-Af Amer)  > 60.0 ml/min  07/12/19  04:35    


 


BUN/Creatinine Ratio  9.0   07/12/19  04:35    


 


Glucose  188 mg/dL ()  H  07/12/19  04:35    


 


POC Glucose  107 MG/DL (70 - 105)  H  07/10/19  16:19    


 


Calcium  8.7 mg/dL (8.6-10.3)   07/12/19  04:35    


 


Total Bilirubin  0.3 mg/dL (0.3-1.0)   07/10/19  15:35    


 


AST  39 U/L (13-39)   07/10/19  15:35    


 


ALT  54 U/L (7-52)  H  07/10/19  15:35    


 


Alkaline Phosphatase  112 U/L ()  H  07/10/19  15:35    


 


Troponin I  < 0.01 ng/mL (0.01-0.05)  L  07/10/19  15:35    


 


Total Protein  6.5 gm/dL (6.0-8.3)   07/10/19  15:35    


 


Albumin  3.8 gm/dL (4.2-5.5)  L  07/10/19  15:35    


 


Globulin  2.7 gm/dL  07/10/19  15:35    


 


Albumin/Globulin Ratio  1.4  (1.0-1.8)   07/10/19  15:35    


 


Urine Source  CLEAN C   07/11/19  02:42    


 


Urine Color  YELLOW   07/11/19  02:42    


 


Urine Clarity  CLEAR  (CLEAR)   07/11/19  02:42    


 


Urine pH  6.0  (4.6 - 8.0)   07/11/19  02:42    


 


Ur Specific Gravity  1.015  (1.005-1.030)   07/11/19  02:42    


 


Urine Protein  NEGATIVE mg/dL (NEGATIVE)   07/11/19  02:42    


 


Urine Glucose (UA)  NEGATIVE mg/dL (NEGATIVE)   07/11/19  02:42    


 


Urine Ketones  NEGATIVE mg/dL (NEGATIVE)   07/11/19  02:42    


 


Urine Blood  NEGATIVE  (NEGATIVE)   07/11/19  02:42    


 


Urine Nitrate  NEGATIVE  (NEGATIVE)   07/11/19  02:42    


 


Urine Bilirubin  NEGATIVE  (NEGATIVE)   07/11/19  02:42    


 


Urine Urobilinogen  0.2 E.U./dL (0.2 - 1.0)   07/11/19  02:42    


 


Ur Leukocyte Esterase  NEGATIVE  (NEGATIVE)   07/11/19  02:42    


 


Urine Opiates Screen  POSITIVE  (NEGATIVE)  H  07/13/19  03:50    


 


Urine Methadone Screen  NEGATIVE  (NEGATIVE)   07/13/19  03:50    


 


Ur Barbiturates Screen  NEGATIVE  (NEGATIVE)   07/13/19  03:50    


 


Ur Tricyclics Screen  NEGATIVE  (NEGATIVE)   07/13/19  03:50    


 


Ur Phencyclidine Scrn  NEGATIVE  (NEGATIVE)   07/13/19  03:50    


 


Amphetamines Screen  NEGATIVE  (NEGATIVE)   07/13/19  03:50    


 


U Methamphetamines Scrn  NEGATIVE  (NEGATIVE)   07/13/19  03:50    


 


U Benzodiazepines Scrn  POSITIVE  (NEGATIVE)  H  07/13/19  03:50    


 


U Cocaine Metab Screen  NEGATIVE  (NEGATIVE)   07/13/19  03:50    


 


U Cannabinoids Screen  NEGATIVE  (NEGATIVE)   07/13/19  03:50    














- Physical Exam


Vitals and I&O: 


 Vital Signs











Temp  100.5 F   07/14/19 12:00


 


Pulse  109   07/14/19 12:00


 


Resp  18   07/14/19 12:00


 


BP  119/87   07/14/19 12:00


 


Pulse Ox  97   07/14/19 12:00








 Intake & Output











 07/13/19 07/14/19 07/14/19





 18:59 06:59 18:59


 


Intake Total 1000 400 


 


Output Total  600 


 


Balance 1000 -200 


 


Weight (lbs) 215 lb 215 lb 


 


Intake:   


 


  Oral 1000 400 


 


Output:   


 


  Urine  600 


 


Other:   


 


  # Voids 4  


 


  # Bowel Movements 0  


 


  Weight Source Bedscale Bedscale 











Active Medications: 


Current Medications





Acetaminophen (Tylenol)  325 mg PO Q6H PRN


   PRN Reason: Fever > 101


   Stop: 09/09/19 12:43


   Last Admin: 07/12/19 05:20 Dose:  325 mg


Alprazolam (Xanax)  0.25 mg PO Q8HR PRN; Protocol


   PRN Reason: Anxiety


   Stop: 09/10/19 17:26


   Last Admin: 07/13/19 14:11 Dose:  0.25 mg


Hydromorphone HCl (Dilaudid)  1 mg IVP Q4HR PRN


   PRN Reason: Pain (Moderate)


   Stop: 09/08/19 15:22


   Last Admin: 07/11/19 06:26 Dose:  1 mg


Hydromorphone HCl (Dilaudid)  2 mg IVP Q4HR PRN


   PRN Reason: Pain (Severe)


   Stop: 09/08/19 15:22


   Last Admin: 07/13/19 23:55 Dose:  2 mg


Ondansetron HCl (Zofran)  4 mg IV Q6H PRN


   PRN Reason: Nausea / Vomiting


   Stop: 09/08/19 15:23


   Last Admin: 07/14/19 05:42 Dose:  4 mg








General: alert, NAD


HEENT: NC/AT, PERRLA, EOMI


Neck: Supple, No JVD


Lungs: CTAB


Cardiovascular: RRR, Normal S1, Normal S2


Abdomen: soft, non-tender, non-distended


Extremities: excoriation, other (L BKA)


Neurological: alert





Internal Medicine Assmt/Plan





- Assessment


Assessment: 





Chronic pain syndrome


Neuritis


Generalized weakness


L BKA   


Anxiety


Hx DMs


Urine tox +opiates, + benzo





- Plan


Plan: 





Continue current treatment plan


Continue to monitor VS and I&O


Pain management as needed


Dispo planning with interdisciplinary team


Fall Precaution

## 2019-07-15 RX ADMIN — DEXTROMETHORPHAN HBR AND GUAIFENESIN SCH: 10; 100 SOLUTION ORAL at 13:18

## 2019-07-15 RX ADMIN — DEXTROMETHORPHAN HBR AND GUAIFENESIN SCH ML: 10; 100 SOLUTION ORAL at 04:09

## 2019-07-15 NOTE — INTERNAL MEDICINE PROG NOTE
Internal Medicine Subjective





- Subjective


Service Date: 07/15/19


Patient seen and examined:: with staff


Patient is:: awake, verbal, in bed, talking


Patient Complaints of:: other (reports felt anxious overnight and received anti-

anxiety med)


Per staff patient has:: no adverse event, no episodes of fall, tolerating meds





Internal Medicine Objective





- Results


Result Diagrams: 


 07/12/19 04:35





 07/12/19 04:35


Recent Labs: 


 Laboratory Last Values











WBC  6.8 Th/cmm (4.8-10.8)   07/12/19  04:35    


 


RBC  5.12 Mil/cmm (4.30-5.70)   07/12/19  04:35    


 


Hgb  14.4 gm/dL (12-16)   07/12/19  04:35    


 


Hct  42.5 % (41.0-60)   07/12/19  04:35    


 


MCV  82.9 fl (80-99)   07/12/19  04:35    


 


MCH  28.1 pg (26.0-30.0)   07/12/19  04:35    


 


MCHC Differential  33.9 pg (28.0-36.0)   07/12/19  04:35    


 


RDW  14.0 % (11.5-20.0)   07/12/19  04:35    


 


Plt Count  153 Th/cmm (150-400)   07/12/19  04:35    


 


MPV  8.6 fl  07/12/19  04:35    


 


Neutrophils %  71.0 % (40.0-80.0)   07/12/19  04:35    


 


Lymphocytes %  19.3 % (20.0-50.0)  L  07/12/19  04:35    


 


Monocytes %  9.3 % (2.0-10.0)   07/12/19  04:35    


 


Eosinophils %  0.2 % (0.0-5.0)   07/12/19  04:35    


 


Basophils %  0.2 % (0.0-2.0)   07/12/19  04:35    


 


Sodium  134 mEq/L (136-145)  L  07/12/19  04:35    


 


Potassium  3.9 mEq/L (3.5-5.1)   07/12/19  04:35    


 


Chloride  102 mEq/L ()   07/12/19  04:35    


 


Carbon Dioxide  23.4 mEq/L (21.0-31.0)   07/12/19  04:35    


 


Anion Gap  12.5  (7.0-16.0)   07/12/19  04:35    


 


BUN  9 mg/dL (7-25)   07/12/19  04:35    


 


Creatinine  1.0 mg/dL (0.7-1.3)   07/12/19  04:35    


 


Est GFR ( Amer)  > 60.0 ml/min (>90)   07/12/19  04:35    


 


Est GFR (Non-Af Amer)  > 60.0 ml/min  07/12/19  04:35    


 


BUN/Creatinine Ratio  9.0   07/12/19  04:35    


 


Glucose  188 mg/dL ()  H  07/12/19  04:35    


 


POC Glucose  107 MG/DL (70 - 105)  H  07/10/19  16:19    


 


Calcium  8.7 mg/dL (8.6-10.3)   07/12/19  04:35    


 


Total Bilirubin  0.3 mg/dL (0.3-1.0)   07/10/19  15:35    


 


AST  39 U/L (13-39)   07/10/19  15:35    


 


ALT  54 U/L (7-52)  H  07/10/19  15:35    


 


Alkaline Phosphatase  112 U/L ()  H  07/10/19  15:35    


 


Troponin I  < 0.01 ng/mL (0.01-0.05)  L  07/10/19  15:35    


 


Total Protein  6.5 gm/dL (6.0-8.3)   07/10/19  15:35    


 


Albumin  3.8 gm/dL (4.2-5.5)  L  07/10/19  15:35    


 


Globulin  2.7 gm/dL  07/10/19  15:35    


 


Albumin/Globulin Ratio  1.4  (1.0-1.8)   07/10/19  15:35    


 


Urine Source  CLEAN C   07/11/19  02:42    


 


Urine Color  YELLOW   07/11/19  02:42    


 


Urine Clarity  CLEAR  (CLEAR)   07/11/19  02:42    


 


Urine pH  6.0  (4.6 - 8.0)   07/11/19  02:42    


 


Ur Specific Gravity  1.015  (1.005-1.030)   07/11/19  02:42    


 


Urine Protein  NEGATIVE mg/dL (NEGATIVE)   07/11/19  02:42    


 


Urine Glucose (UA)  NEGATIVE mg/dL (NEGATIVE)   07/11/19  02:42    


 


Urine Ketones  NEGATIVE mg/dL (NEGATIVE)   07/11/19  02:42    


 


Urine Blood  NEGATIVE  (NEGATIVE)   07/11/19  02:42    


 


Urine Nitrate  NEGATIVE  (NEGATIVE)   07/11/19  02:42    


 


Urine Bilirubin  NEGATIVE  (NEGATIVE)   07/11/19  02:42    


 


Urine Urobilinogen  0.2 E.U./dL (0.2 - 1.0)   07/11/19  02:42    


 


Ur Leukocyte Esterase  NEGATIVE  (NEGATIVE)   07/11/19  02:42    


 


Urine Opiates Screen  POSITIVE  (NEGATIVE)  H  07/13/19  03:50    


 


Urine Methadone Screen  NEGATIVE  (NEGATIVE)   07/13/19  03:50    


 


Ur Barbiturates Screen  NEGATIVE  (NEGATIVE)   07/13/19  03:50    


 


Ur Tricyclics Screen  NEGATIVE  (NEGATIVE)   07/13/19  03:50    


 


Ur Phencyclidine Scrn  NEGATIVE  (NEGATIVE)   07/13/19  03:50    


 


Amphetamines Screen  NEGATIVE  (NEGATIVE)   07/13/19  03:50    


 


U Methamphetamines Scrn  NEGATIVE  (NEGATIVE)   07/13/19  03:50    


 


U Benzodiazepines Scrn  POSITIVE  (NEGATIVE)  H  07/13/19  03:50    


 


U Cocaine Metab Screen  NEGATIVE  (NEGATIVE)   07/13/19  03:50    


 


U Cannabinoids Screen  NEGATIVE  (NEGATIVE)   07/13/19  03:50    














- Physical Exam


Vitals and I&O: 


 Vital Signs











Temp  97.8 F   07/15/19 08:00


 


Pulse  94   07/15/19 08:00


 


Resp  18   07/15/19 08:00


 


BP  95/59   07/15/19 08:00


 


Pulse Ox  98   07/15/19 08:00








 Intake & Output











 07/14/19 07/15/19 07/15/19





 18:59 06:59 18:59


 


Intake Total 700 1385.417 


 


Output Total  700 


 


Balance 700 685.417 


 


Weight (lbs) 97.522 kg 97.522 kg 


 


Intake:   


 


  Intake, IV Amount  985.417 


 


    Sodium Chloride 0.45% 1,  985.417 





    000 ml @ 125 mls/hr IV .   





    Q8H [facility] Rx#:[id number]   


 


  Oral 700 400 


 


Output:   


 


  Urine  700 


 


Other:   


 


  # Voids 4  


 


  # Bowel Movements 0  


 


  Weight Source Bedscale Bedscale 











Active Medications: 


Current Medications





Acetaminophen (Tylenol)  325 mg PO Q6H PRN


   PRN Reason: Fever > 101


   Stop: 09/09/19 12:43


   Last Admin: 07/15/19 04:09 Dose:  325 mg


Alprazolam (Xanax)  0.25 mg PO Q8HR PRN; Protocol


   PRN Reason: Anxiety


   Stop: 09/10/19 17:26


   Last Admin: 07/13/19 14:11 Dose:  0.25 mg


Guaifenesin/Dextromethorphan (Robitussin Dm)  10 ml PO Q8HR CAMELIA


   Stop: 07/17/19 20:59


   Last Admin: 07/15/19 04:09 Dose:  10 ml


Hydromorphone HCl (Dilaudid)  1 mg IVP Q4HR PRN


   PRN Reason: Pain (Moderate)


   Stop: 09/08/19 15:22


   Last Admin: 07/11/19 06:26 Dose:  1 mg


Hydromorphone HCl (Dilaudid)  2 mg IVP Q4HR PRN


   PRN Reason: Pain (Severe)


   Stop: 09/08/19 15:22


   Last Admin: 07/13/19 23:55 Dose:  2 mg


Ondansetron HCl (Zofran)  4 mg IV Q6H PRN


   PRN Reason: Nausea / Vomiting


   Stop: 09/08/19 15:23


   Last Admin: 07/15/19 10:22 Dose:  4 mg








Physical Exam: 





Patient c/o weakness and chronic pain.


General: alert, NAD


HEENT: NC/AT, PERRLA, EOMI


Neck: Supple, No JVD


Lungs: CTAB


Cardiovascular: RRR, Normal S1, Normal S2


Abdomen: soft, non-tender, non-distended


Extremities: excoriation, other (L BKA)


Neurological: alert





Internal Medicine Assmt/Plan





- Assessment


Assessment: 





Chronic pain syndrome.


Neuritis.


Generalized weakness.


L BKA.


Anxiety.


History of Diabetes.


Urine Tox + Opiates, + Benzo.








- Plan


Plan: 


Continuation of care. 


Monitor Labs.


Continue present meds as directed.


Monitor Diet/Nutritional support.


Pain Management.


Physical therapy.


Occupational therapy.


Fall precaution, frequent nursing rounds, and as needed restraints to prevent 

fall.


Supportive care. 


Continue collaborating with consulting specialists, case management and nursing 

team.


Will Monitor patient and continue current treatment plan as ordered. 








Nutritional Asmnt/Malnutr-PDOC





- Dietary Evaluation


Malnutrition Findings (Please click <Entered> for more info): 





see orders.

## 2019-07-16 NOTE — DISCHARGE SUMMARY
General Discharge Summary





- Discharge Summary


Date of Admission: 07/10/19


Admitting Diagnosis: Neuritis, Chronic pain syndrome, History of Diabetes, 

Generalized weakness.


Discharge Date: 07/15/19


Discharge Diagnosis: Neuritis-controlled, Chronic pain syndrome-controlled, 

History of Diabetes-controlled, Generalized weakness, and S/p Left below the 

knee amputation x 2 years.


Laboratory Findings: 





see chart.


Hospital Course: 


DISCHARGE SUMMARY





Date of Admission is 07/10/2019.





Date of Discharge is 07/15/2019.





ADMITTING DIAGNOSIS:





Neuritis, Chronic pain syndrome, History of Diabetes, Generalized weakness, S/p 

Left below the knee amputation x 2 years.





DISCHARGE DIAGNOSIS (es):





Neuritis-controlled, Chronic pain syndrome-controlled, History of Diabetes-

controlled, Generalized weakness, S/p Left below the knee amputation x 2 years





Patient was admitted to: Med/Surg for the following evaluations and treatment.





Hospital Course and Treatment Rendered: IV Antibiotics, Urinalysis, Lab tests, x

-ray, Monitor vitals, Supportive care, Fall precaution, Local skin care and 

wound care and Internal medicine consult.








Adjustment of medication: IV Antibiotics.





The course of hospitalization was uncomplicated and the course of the 

treatments was uneventful.





CONSULT: Internal medicine consult.





Diagnostic Study (ies): X-ray.





DISCHARGE CONDITION: Stable.





Patient discharged Home.





Activity: Resume normal activity as tolerated. 





Diet: Resume previous diet.





Medications: Please see medication reconciliation sheet.





I will Follow-up with patient with in x 2 days.





Discharge Instructions: Patient/Family instructed on diagnosis, follow-up, and 

diagnostic testing. 





Greater than 30 minutes was spent with patient on discharge. 








Treatment: 


IV Antibiotics, Urinalysis, Lab tests, x-ray, Monitor vitals, Supportive care, 

Fall precaution, Local skin care and wound care and Internal medicine consult.








Condition at Discharge: Stable


Disposition: PT DISCHARGED HOME


Home Medications: 


Home Medication











 Medication  Instructions  Recorded  Type


 


Lorazepam [Ativan] 0.5 mg PO X1 07/10/19 History











Activity: As Tolerated


Discharge Diet: Other (see orders.)


Consults and Follow-Up: 


Abraham Diego [Courtesy] - 


Consulting Speciality: Other (Patient will follow up with Internal medicine.)


Instructions:  Pain, Neuropathic

## 2025-02-27 NOTE — ED PHYSICIAN CHART
Attempted to call patient. No answer. Left voicemail for patient to return call.     ED Chief Complaint/HPI





- Patient Information


Date Seen:: 11/19/18


Time Seen:: 16:41


Chief Complaint:: LUE pain s/p op on 11/16/18


History of Present Illness:: 





LUE pain s/p op on 11/16/18


Allergies:: 


 Allergies











Allergy/AdvReac Type Severity Reaction Status Date / Time


 


No Known Allergies Allergy   Verified 03/06/18 21:06











Vitals:: 


 Vital Signs - 8 hr











  11/19/18





  16:41


 


Temp 98.4 F


 





 


RR 19


 


/68


 


O2 Sat % 97











Historian:: Patient


Review:: Nurse's Note Reviewed





ED Review of Systems





- Review of Systems


General/Constitutional: No fever, No chills, No weight loss, No weakness, No 

diaphoresis, No edema, No loss of appetite


Skin: No skin lesions, No rash, No bruising


Head: No headache, No light-headedness


Eyes: No loss of vision, No pain, No diplopia


ENT: No earache, No nasal drainage, No sore throat, No tinnitus


Neck: No neck pain, No swelling, No thyromegaly, No stiffness, No mass noted


Cardio Vascular: No chest pain, No palpitations, No PND, No orthopnea, No edema


Pulmonary: No SOB, No cough, No sputum, No wheezing


GI: No nausea, No vomiting, No diarrhea, No pain, No melena, No hematochezia, 

No constipation, No hematemesis


G/U: No dysuria, No frequency, No hematuria


Musculoskeletal: No bone or joint pain, Other (LUE pain)


Endocrine: No polyuria, No polydipsia


Psychiatric: No prior psych history, No depression, No anxiety, No suicidal 

ideation, No homicidal ideation, No auditory hallucination, No visual 

hallucination


Hematopoietic: Bruising


Allergic/Immuno: No urticaria, No angioedema


Neurological: No syncope, No focal symptoms, No weakness, No paresthesia, No 

headache, No seizure, No dizziness, No confusion, No vertigo





ED Past Medical History





- Past Medical History


Obtainable: Yes


Past Medical History: Other (LUE bone graft on 11/16/18; L AKA amputation; 

chronic pain)


Surgical History: other (left above knee amputation)





Family Medical History





- Family Member


  ** Mother


History Unknown: Yes


Ethnicity: 


Living Status: Still Living


Hx Family Cancer: No


Hx Family Coronary Artery Disease: No


Hx Family Congestive Heart Failure: No


Hx Family Hypertension: No


Hx Family Stroke: No


Hx Family Diabetes: No


Hx Family Seizures: No


Hx Family Dementia: No


Hx Family AIDS: No


Hx Family HIV: No


Hx Family COPD: No


Hx Family Hepatitis: No


Hx Family Psychiatric Problems: No


Hx Family Tuberculosis: No





ED Physical Exam





- Physical Examination


General/Constitutional: Awake, Well-developed, well-nourished, Alert, No 

distress, GCS 15, Non-toxic appearing, Ambulatory


Other Gen/Cons comments:: 





took his multiple pain pills that he takes on a chronic basis before he came in.


Head: Atraumatic


Eyes: Lids, conjuctiva normal, PERRL, EOMI


Other Skin comments:: 





LUE with intact stables.  No s/s of compartment syndrome.  Distal NV intact.  

No s/s of infection.  No cellulitis or crepitance.


ENMT: External ears, nose nl


Neck: Nontender, Full ROM w/o pain, No nuchal rigidity, No stridor


Respiratory: Nl effort/Exclusion, Clear to Auscultation, No Wheeze/Rhonchi/Rales


Cardio Vascular: RRR, No murmur, gallop, rubs, NL S1 S2


GI: No tenderness/rebounding/guarding, No organomegaly, No hernia, Normal BS's, 

Nondistended, No mass/bruits, No McBurney tenderness


: No CVA tenderness


Other Extremities comments:: 








LUE with intact stables.  No s/s of compartment syndrome.  Distal NV intact.  

No s/s of infection.  No cellulitis or crepitance.





L AKA (old)


Neuro/Psych: Alert/oriented, Normal sensory exam, Normal motor strength, 

Judgement/insight normal, Mood normal, No focal deficits


Misc: Normal back, No paraspinal tenderness





ED Labs/Radiology/EKG Results





- Lab Results


Results: 


 Laboratory Tests











  11/19/18





  17:05


 


WBC  8.8


 


RBC  4.74


 


Hgb  13.8


 


Hct  40.1 L


 


MCV  84.6


 


MCH  29.2


 


MCHC Differential  34.5


 


RDW  14.0


 


Plt Count  348


 


MPV  7.5


 


Neutrophils %  65.1


 


Lymphocytes %  27.0


 


Monocytes %  6.1


 


Eosinophils %  1.1


 


Basophils %  0.7














ED Assessment





- Assessment


General Assessment: 


ultrasound of the LUE reveals a hematoma which measures 7.4 cm x 3.5 x 5.8 cm. 


There is no color flow or pusatile changes seen within the left upper arm.  








called and spoke with Dr. Conor Aburto who is covering for Dr. Andrew Figueroa from 

orthopedics.  Dr. Figueroa is out of town according to Dr. Conor Aburto.  I conveyed to 

Dr. Aburto that the patient has a hematoma which measures 7.4 cm x 3.5 x 5.8 cm.  

There is no color flow or pusatile changes seen within the left upper arm.  





Dr. Conor Aburto said that the patient did not need to be seen tonight and that he 

could call the office tomorrow morning to make an appointment to see one of Dr. Figueroa's partners in his office.  The office phone number to call is [phone].





ED Septic Shock





- .


Is Septic Shock (SBP<90, OR Lactate>4 mmol\L) present?: No





- <6hrs of presentation:


Vital Signs: 


 Vital Signs - 8 hr











  11/19/18





  16:41


 


Temp 98.4 F


 





 


RR 19


 


/68


 


O2 Sat % 97














ED Reassessment (Disposition)





- Reassessment


Reassessment Condition:: Improved





- Diagnosis


Diagnosis:: 





Left upper extremity hematoma, postoperative from bone graft surgery on 11/16/ 18.





Call Dr. Figueroa's office at [phone] tomorrow to see 





- Aftercare/Follow up Instructions


Aftercare/Follow-Up Instructions:: Refer to Discharge Instructions





- Patient Disposition


Discharge/Transfer:: Home


Condition at Disposition:: Stable, Improved